# Patient Record
Sex: FEMALE | Race: WHITE
[De-identification: names, ages, dates, MRNs, and addresses within clinical notes are randomized per-mention and may not be internally consistent; named-entity substitution may affect disease eponyms.]

---

## 2017-08-28 ENCOUNTER — HOSPITAL ENCOUNTER (INPATIENT)
Dept: HOSPITAL 42 - ED | Age: 67
LOS: 4 days | Discharge: NURSING, REHAB OR LONG-TERM CARE | DRG: 313 | End: 2017-09-01
Attending: INTERNAL MEDICINE | Admitting: INTERNAL MEDICINE
Payer: MEDICARE

## 2017-08-28 VITALS — BODY MASS INDEX: 35.3 KG/M2

## 2017-08-28 DIAGNOSIS — F31.9: ICD-10-CM

## 2017-08-28 DIAGNOSIS — G20: ICD-10-CM

## 2017-08-28 DIAGNOSIS — Z79.899: ICD-10-CM

## 2017-08-28 DIAGNOSIS — F41.9: ICD-10-CM

## 2017-08-28 DIAGNOSIS — D64.9: ICD-10-CM

## 2017-08-28 DIAGNOSIS — J30.2: ICD-10-CM

## 2017-08-28 DIAGNOSIS — R40.2412: ICD-10-CM

## 2017-08-28 DIAGNOSIS — R19.7: ICD-10-CM

## 2017-08-28 DIAGNOSIS — F22: ICD-10-CM

## 2017-08-28 DIAGNOSIS — Q79.1: ICD-10-CM

## 2017-08-28 DIAGNOSIS — Z85.3: ICD-10-CM

## 2017-08-28 DIAGNOSIS — E66.9: ICD-10-CM

## 2017-08-28 DIAGNOSIS — E11.9: ICD-10-CM

## 2017-08-28 DIAGNOSIS — F25.9: ICD-10-CM

## 2017-08-28 DIAGNOSIS — Z90.710: ICD-10-CM

## 2017-08-28 DIAGNOSIS — Z90.49: ICD-10-CM

## 2017-08-28 DIAGNOSIS — G47.00: ICD-10-CM

## 2017-08-28 DIAGNOSIS — Z79.82: ICD-10-CM

## 2017-08-28 DIAGNOSIS — E78.00: ICD-10-CM

## 2017-08-28 DIAGNOSIS — R09.02: ICD-10-CM

## 2017-08-28 DIAGNOSIS — Z87.442: ICD-10-CM

## 2017-08-28 DIAGNOSIS — R07.89: Primary | ICD-10-CM

## 2017-08-28 DIAGNOSIS — R26.9: ICD-10-CM

## 2017-08-28 DIAGNOSIS — G30.9: ICD-10-CM

## 2017-08-28 DIAGNOSIS — K59.00: ICD-10-CM

## 2017-08-28 DIAGNOSIS — R00.1: ICD-10-CM

## 2017-08-28 DIAGNOSIS — I10: ICD-10-CM

## 2017-08-28 DIAGNOSIS — I95.9: ICD-10-CM

## 2017-08-28 DIAGNOSIS — E03.9: ICD-10-CM

## 2017-08-28 DIAGNOSIS — F02.80: ICD-10-CM

## 2017-08-28 DIAGNOSIS — E78.5: ICD-10-CM

## 2017-08-28 DIAGNOSIS — M54.5: ICD-10-CM

## 2017-08-28 LAB
ALBUMIN/GLOB SERPL: 1.2 {RATIO} (ref 1.1–1.8)
ALP SERPL-CCNC: 115 U/L (ref 38–133)
ALT SERPL-CCNC: 29 U/L (ref 7–56)
APTT BLD: 27.2 SECONDS (ref 23.7–30.8)
AST SERPL-CCNC: 26 U/L (ref 15–39)
BASOPHILS # BLD AUTO: 0.03 K/MM3 (ref 0–2)
BASOPHILS NFR BLD: 0.3 % (ref 0–3)
BILIRUB SERPL-MCNC: 0.4 MG/DL (ref 0.2–1.3)
BUN SERPL-MCNC: 18 MG/DL (ref 7–21)
CALCIUM SERPL-MCNC: 9.1 MG/DL (ref 8.4–10.5)
CHLORIDE SERPL-SCNC: 104 MMOL/L (ref 98–107)
CHOLEST SERPL-MCNC: 90 MG/DL (ref 130–200)
CO2 SERPL-SCNC: 26 MMOL/L (ref 21–33)
EOSINOPHIL # BLD: 0.2 10*3/UL (ref 0–0.7)
EOSINOPHIL NFR BLD: 1.6 % (ref 1.5–5)
ERYTHROCYTE [DISTWIDTH] IN BLOOD BY AUTOMATED COUNT: 15.4 % (ref 11.5–14.5)
GLOBULIN SER-MCNC: 3.4 GM/DL
GLUCOSE SERPL-MCNC: 106 MG/DL (ref 70–110)
GRANULOCYTES # BLD: 6.48 10*3/UL (ref 1.4–6.5)
GRANULOCYTES NFR BLD: 63.5 % (ref 50–68)
HCT VFR BLD CALC: 34.4 % (ref 36–48)
INR PPP: 1.03 (ref 0.93–1.08)
LIPASE SERPL-CCNC: 66 U/L (ref 23–300)
LYMPHOCYTES # BLD: 2.5 10*3/UL (ref 1.2–3.4)
LYMPHOCYTES NFR BLD AUTO: 24.2 % (ref 22–35)
MCH RBC QN AUTO: 30.9 PG (ref 25–35)
MCHC RBC AUTO-ENTMCNC: 34.3 G/DL (ref 31–37)
MCV RBC AUTO: 90.1 FL (ref 80–105)
MONOCYTES # BLD AUTO: 1.1 10*3/UL (ref 0.1–0.6)
MONOCYTES NFR BLD: 10.4 % (ref 1–6)
PLATELET # BLD: 217 10^3/UL (ref 120–450)
PMV BLD AUTO: 12.5 FL (ref 7–11)
POTASSIUM SERPL-SCNC: 3.7 MMOL/L (ref 3.6–5)
PROT SERPL-MCNC: 7.6 G/DL (ref 5.8–8.3)
SODIUM SERPL-SCNC: 140 MMOL/L (ref 132–148)
T4 FREE SERPL-MCNC: 1.2 NG/DL (ref 0.78–2.19)
T4 SERPL-MCNC: 7.8 UG/DL (ref 5.5–11)
TROPONIN I SERPL-MCNC: < 0.01 NG/ML
TSH SERPL-ACNC: 2.74 MIU/ML (ref 0.46–4.68)
WBC # BLD AUTO: 10.2 10^3/UL (ref 4.5–11)

## 2017-08-28 RX ADMIN — INSULIN HUMAN SCH: 100 INJECTION, SOLUTION PARENTERAL at 17:40

## 2017-08-28 RX ADMIN — INSULIN HUMAN SCH: 100 INJECTION, SOLUTION PARENTERAL at 21:27

## 2017-08-28 RX ADMIN — ENOXAPARIN SODIUM SCH MG: 40 INJECTION SUBCUTANEOUS at 14:43

## 2017-08-28 RX ADMIN — PANTOPRAZOLE SODIUM SCH MG: 40 TABLET, DELAYED RELEASE ORAL at 14:43

## 2017-08-28 NOTE — ED PDOC
Arrival/HPI





- General


Time Seen by Provider: 08/28/17 11:21


Historian: Patient





- History of Present Illness


Narrative History of Present Illness (Text): 


08/28/17 11:25


Dulce Rhodes is a 67 year old female, whose past medical history includes 

hypertension and diabetes, Alzheimer's/Dementia, presents to the emergency 

department complaining of chest pain for the past two days. Patient reports the 

pain radiates to both left and right side of the chest. Patient describes the 

pain as chest pressure. She also states feeling tired with generalized fatigue. 

Patient denies shortness of breath, headache, fever, chills cough, nausea, 

vomiting, diarrhea, diaphoresis, jaw pain, back pain, or other complaints. 

Family reports that she is getting more agitated at home and are looking into 

long term placement.








Time/Duration: < week (2 dyas)


Symptom Onset: Sudden


Symptom Course: Unchanged


Quality: Pressure


Modifying Factors (Text): 


worse after eating


Associated Symptoms (Text): 


tired, weakness in legs, and epigastric pain





Past Medical History





- Provider Review


Nursing Documentation Reviewed: Yes





Family/Social History





- Physician Review


Nursing Documentation Reviewed: Yes


Family/Social History: Unknown Family HX





Allergies/Home Meds


Allergies/Adverse Reactions: 


Allergies





No Known Allergies Allergy (Unverified 08/28/17 11:28)


 








Home Medications: 


 Home Meds











 Medication  Instructions  Recorded  Confirmed


 


Atorvastatin [Lipitor] 40 mg PO DAILY 08/28/17 08/28/17


 


Clonazepam 0.5 mg PO DAILY 08/28/17 08/28/17


 


Escitalopram [Lexapro] 20 mg PO DAILY 08/28/17 08/28/17


 


Levothyroxine [Synthroid] 25 mcg PO DAILY 08/28/17 08/28/17


 


Lisinopril [Zestril] 30 mg PO DAILY 08/28/17 08/28/17


 


Multivitamin [Multivitamins] 1 each PO DAILY 08/28/17 08/28/17


 


Naproxen [Naprosyn Tab] 250 mg PO BID 08/28/17 08/28/17


 


Perphenazine 8 mg PO DAILY 08/28/17 08/28/17


 


traZODone [trazodone Hydrochloride] 100 mg PO BID 08/28/17 08/28/17














Review of Systems





- Review of Systems


Constitutional: Fatigue.  absent: Fevers


Eyes: absent: Vision Changes


ENT: absent: Hearing Changes


Respiratory: absent: SOB, Cough, Sputum, Wheezing


Cardiovascular: Chest Pain.  absent: Palpitations, Calf Pain, Orthopnea, Syncope


Gastrointestinal: absent: Abdominal Pain, Constipation, Diarrhea, Nausea, 

Vomiting


Genitourinary Female: absent: Dysuria, Frequency, Hematuria


Musculoskeletal: absent: Back Pain


Neurological: absent: Headache


Endocrine: absent: Diaphoresis





Physical Exam


Vital Signs Reviewed: Yes


Vital Signs











  Temp Pulse Pulse Resp BP BP Pulse Ox


 


 08/28/17 12:28   69   20  198/86 H   96


 


 08/28/17 11:35    74    137/108 H 


 


 08/28/17 11:30  98.3 F  74   27 H  137/108 H   97


 


 08/28/17 11:24  98.3 F  73   20  193/70 H   98











Temperature: Afebrile


Blood Pressure: Hypertensive


Pulse: Regular


Respiratory Rate: Normal


Appearance: Positive for: Well-Appearing, Non-Toxic, Comfortable


Pain Distress: None


Mental Status: Positive for: Alert and Oriented X 3





- Systems Exam


Head: Present: Atraumatic, Normocephalic


Pupils: Present: PERRL


Extroacular Muscles: Present: EOMI


Conjunctiva: Present: Normal


Mouth: Present: Moist Mucous Membranes


Neck: Present: Normal Range of Motion


Respiratory/Chest: Present: Clear to Auscultation, Good Air Exchange.  No: 

Respiratory Distress, Accessory Muscle Use


Cardiovascular: Present: Regular Rate and Rhythm, Normal S1, S2.  No: Murmurs


Abdomen: Present: Normal Bowel Sounds.  No: Tenderness, Distention, Peritoneal 

Signs


Back: Present: Normal Inspection


Upper Extremity: Present: Normal Inspection, NORMAL PULSES (equal bilateral 

radial pulses).  No: Cyanosis, Edema


Lower Extremity: Present: Normal Inspection.  No: Edema


Neurological: Present: GCS=15, CN II-XII Intact, Speech Normal


Skin: Present: Warm, Dry, Normal Color.  No: Rashes


Psychiatric: Present: Alert, Oriented x 3, Normal Insight, Normal Concentration





Medical Decision Making


ED Course and Treatment: 


08/28/17 11:25


Impression:


67 year old female with chest pain for the past two days that radiates to both 

sides of chest. 





Differential Diagnosis included but are not limited to:  pneumonia vs. 

pneumothorax vs acs





Plan:


-- EKG


-- Chest X-ray


-- Labs


-- Aspirin


-- Reassess and disposition








Progress Notes:


 


EKG:


Ordered, reviewed, and independently interpreted the EKG.


Rate :  69 BPM


Rhythm : NSR


Interpretation : No ST-segment elevations or depressions, no T-wave inversions, 

normal intervals.


Comparison : No previous EKG for comparison. 





08/28/17 12:20


Chest X-ray: Creator : Jose Carlos Herring MD


FINDINGS:


LUNGS: No active pulmonary disease.


PLEURA: No significant pleural effusion identified, no pneumothorax apparent.


CARDIOVASCULAR: Normal.


OSSEOUS STRUCTURES: No significant abnormalities.


VISUALIZED UPPER ABDOMEN: There is focal eventration of the left hemidiaphragm.


OTHER FINDINGS: None.


IMPRESSION: No active disease.








08/28/17 14:19


Patient has multiple cardiac risk factors and has never had a cardiac workup.  

Paient accepted by Dr. Abbott-covering medicine on call today.  Family at 

bedside.  Psychiatric consult placed due to worsening agitation at day program.





- Lab Interpretations


Lab Results: 








 08/28/17 11:45 





 08/28/17 11:45 





 Lab Results





08/28/17 11:45: Sodium 140, Potassium 3.7, Chloride 104, Carbon Dioxide 26, 

Anion Gap 14, BUN 18, Creatinine 0.7, Est GFR (African Amer) > 60, Est GFR (Non-

Af Amer) > 60, Random Glucose 106, Calcium 9.1, Total Bilirubin 0.4, AST 26, 

ALT 29, Alkaline Phosphatase 115, Total Creatine Kinase 97, Troponin I < 0.01, 

Total Protein 7.6, Albumin 4.2, Globulin 3.4, Albumin/Globulin Ratio 1.2, 

Lipase 66


08/28/17 11:45: PT 11.1, INR 1.03, APTT 27.2


08/28/17 11:45: WBC 10.2, RBC 3.82, Hgb 11.8 L, Hct 34.4 L, MCV 90.1, MCH 30.9, 

MCHC 34.3, RDW 15.4 H, Plt Count 217, MPV 12.5 H, Gran % 63.5, Lymph % (Auto) 

24.2, Mono % (Auto) 10.4 H, Eos % (Auto) 1.6, Baso % (Auto) 0.3, Gran # 6.48, 

Lymph # 2.5, Mono # 1.1 H, Eos # 0.2, Baso # 0.03








I have reviewed the lab results: Yes





- RAD Interpretation


Radiology Orders: 








08/28/17 11:28


CHEST PORTABLE [RAD] Stat 











: Radiologist





- EKG Interpretation


Interpreted by ED Physician: Yes


Type: 12 lead EKG





- Medication Orders


Current Medication Orders: 








Enoxaparin Sodium (Lovenox)  40 mg SC DAILY [facility]


   PRN Reason: Protocol


   Last Admin: 08/28/17 14:43  Dose: 40 mg





Pantoprazole Sodium (Protonix Ec Tab)  40 mg PO DAILY [facility]


   Last Admin: 08/28/17 14:43  Dose: 40 mg





Discontinued Medications





Aspirin (Aspirin Chewable)  324 mg PO STAT STA


   Stop: 08/28/17 11:30


   Last Admin: 08/28/17 12:10  Dose: 324 mg











- Scribe Statement


The provider has reviewed the documentation as recorded by the Scribe


08/28/2017


Kary Haemed





Provider Scribe Attestation:


All medical record entries made by the Scribe were at my direction and 

personally dictated by me. I have reviewed the chart and agree that the record 

accurately reflects my personal performance of the history, physical exam, 

medical decision making, and the department course for this patient. I have 

also personally directed, reviewed, and agree with the discharge instructions 

and disposition. 








Disposition/Present on Arrival





- Present on Arrival


Any Indicators Present on Arrival: No





- Disposition


Have Diagnosis and Disposition been Completed?: Yes


Diagnosis: 


 Chest pain





Disposition: HOSPITALIZED


Disposition Time: 11:27


Patient Plan: Observation


Condition: FAIR

## 2017-08-28 NOTE — CP.PCM.HP
<Chantale Loera - Last Filed: 17 15:23>





History of Present Illness





- History of Present Illness


History of Present Illness: 





Internal medicine H & P for Dr. Diana Loera, PGY-1





Pt S & E at bedside. 





68 YO F w/PMH sig for HTN, DM, Alzheimer's dementia, hx nephrolithiasis, R 

breast CA s/p surgery admitted for HTN and Chest pain x 2 days.  Pt attends 

adult day care where VS are monitored- pt originally sent to ED 2/2 HTN.  Pt 

reports 1 episode of Right sided chest pain - intermittent, moderate, "pressure

", with radiating to LUE- resolved.  Admits to fatigue, HA, R neck pain, Right 

sided UE shaking (remote hx of), spots in vision, blurry vision, cough, 

rhinorrhea, occasional diarrhea, SOB (resolved), insomonia, LE pain/

paresthesias.  Denies N/V/F/C, diaphoresis. 





PMH: HTN, DM, Alzheimer's dementia, hx nephrolithiasis, R breast CA s/p surgery


PSH: Cholecystectomy, hysterectomy, kidney stone removal, R breast surgery


All: Seasonal


SH: Denies ETOH, tobacco, or illicit drug use


PMD: Unknown





Present on Admission





- Present on Admission


Any Indicators Present on Admission: No


History of DVT/PE: No


History of Uncontrolled Diabetes: No


Urinary Catheter: No


Decubitus Ulcer Present: No





Review of Systems





- Review of Systems


All systems: reviewed and no additional remarkable complaints except





- Constitutional


Constitutional: Fatigue, Headache, Weight Gain, Weakness.  absent: Chills, Fever





- EENT


Eyes: Blurred Vision, Change in Vision, Spots in Vision


Ears: absent: Dizziness


Nose/Mouth/Throat: absent: Sore Throat





- Cardiovascular


Cardiovascular: Chest Pain.  absent: Diaphoresis, Leg Edema, Palpitations





- Respiratory


Respiratory: Cough





- Gastrointestinal


Gastrointestinal: Diarrhea (occasional).  absent: Abdominal Pain, Constipation, 

Nausea, Vomiting





- Genitourinary


Genitourinary: absent: Change in Urinary Stream





- Musculoskeletal


Musculoskeletal: Neck Pain, Numbness (of extremities), Tingling (of extremities)





- Psychiatric


Psychiatric: Abnormal Sleep Pattern (insomnia)





Past Patient History





- Past Social History


Smoking Status: Never Smoked





- CARDIAC


Hx Cardiac Disorders: Yes


Hx Hypertension: Yes





- PULMONARY


Hx Respiratory Disorders: No





- NEUROLOGICAL


Hx Neurological Disorder: Yes


Hx Alzheimer's Disease: Yes





- HEENT


Hx HEENT Problems: No





- RENAL


Hx Chronic Kidney Disease: No





- ENDOCRINE/METABOLIC


Hx Endocrine Disorders: Yes


Hx Diabetes Mellitus Type 2: Yes





- HEMATOLOGICAL/ONCOLOGICAL


Hx Blood Disorders: No





- INTEGUMENTARY


Hx Dermatological Problems: No





- MUSCULOSKELETAL/RHEUMATOLOGICAL


Hx Musculoskeletal Disorders: No





- GASTROINTESTINAL


Hx Gastrointestinal Disorders: Yes


Hx Constipation: Yes





- GENITOURINARY/GYNECOLOGICAL


Hx Genitourinary Disorders: No





- PSYCHIATRIC


Hx Substance Use: No





- SURGICAL HISTORY


Hx Surgeries: Yes


Hx  Section: Yes (x3)





Meds


Allergies/Adverse Reactions: 


 Allergies











Allergy/AdvReac Type Severity Reaction Status Date / Time


 


No Known Allergies Allergy   Unverified 17 11:28














Physical Exam





- Constitutional


Appears: Non-toxic, No Acute Distress





- Head Exam


Head Exam: ATRAUMATIC, NORMAL INSPECTION, NORMOCEPHALIC





- Eye Exam


Eye Exam: EOMI, Normal appearance.  absent: Nystagmus





- ENT Exam


ENT Exam: Mucous Membranes Moist, Normal Exam





- Neck Exam


Neck exam: Positive for: Full Rom, Normal Inspection.  Negative for: 

Lymphadenopathy, Meningismus, Tenderness





- Respiratory Exam


Respiratory Exam: Clear to Auscultation Bilateral, NORMAL BREATHING PATTERN.  

absent: Accessory Muscle Use, Chest Wall Tenderness, Rales, Rhonchi, Wheezes, 

Respiratory Distress





- Cardiovascular Exam


Cardiovascular Exam: REGULAR RHYTHM, +S1, +S2





- GI/Abdominal Exam


GI & Abdominal Exam: Normal Bowel Sounds, Soft.  absent: Tenderness





- Extremities Exam


Extremities exam: Positive for: normal inspection.  Negative for: tenderness





- Neurological Exam


Neurological exam: Alert, CN II-XII Intact





- Psychiatric Exam


Psychiatric exam: Normal Affect, Normal Mood





- Skin


Skin Exam: Dry, Intact, Normal Color, Warm





Results





- Vital Signs


Recent Vital Signs: 





 Last Vital Signs











Temp  98.3 F   17 11:30


 


Pulse  70   17 14:59


 


Resp  20   17 14:59


 


BP  166/78 H  17 14:59


 


Pulse Ox  98   17 14:59














- Labs


Result Diagrams: 


 17 11:45





 17 11:45





Assessment & Plan





- Assessment and Plan (Free Text)


Assessment: 





68 YO F w/PMH sig for HTN, DM, Alzheimer's dementia, hx nephrolithiasis, R 

breast CA s/p surgery admitted for HTN and Chest pain x 2 days, currently CP 

resolved, continues with symptoms of HTN - HA, vision changes


Plan: 





Chest pain


EKG -NSR


CXR - NAD


Trops neg x 1


FU serial trops/EKGs


FU A1c


FU CKP


Cont home meds: Lipitor


Given ASA x 1


Cardiology consulted 





HTN


BP improved - 166/74


Cont home med: Lisinopril


clonidine PRN


Monitor





DM


ISS


Accuchecks


Diabetic/HHD 





Alzheimer's dementia


Psych consulted 





Hypothyroidism


FU FT4


FU TSH


Cont home med: Synthyroid





Depression/insomnia


Cont home med: Lexapro, trazodone, clonazepam





GI/DVT ppx


Protonix


Lovenox


TEDs


SCDs





Dispo


Admit to tele


VS as per protocol


OOBTC


PT 


HHD/Mod-CCHO diet





Will DW attending





Luz Maria, PGY-1








- Date & Time


Date: 17


Time: 15:13





Decision To Admit





- Pt Status Changed To:


Hospital Disposition Of: Observation





- .


Bed Request Type: Telemetry


Admitting Physician: Rufus Abbott





<Rufus Abbott - Last Filed: 17 22:12>





Results





- Vital Signs


Recent Vital Signs: 





 Last Vital Signs











Temp  98.2 F   17 07:40


 


Pulse  66   17 07:40


 


Resp  20   17 07:40


 


BP  116/69   17 10:22


 


Pulse Ox  95   17 07:40














- Labs


Result Diagrams: 


 17 07:40





 17 07:40





Attending/Attestation





- Attestation


I have personally seen and examined this patient.: Yes


I have fully participated in the care of the patient.: Yes


I have reviewed all pertinent clinical information: Yes

## 2017-08-28 NOTE — RAD
HISTORY:

chest pain  



COMPARISON:

No prior. 



FINDINGS:



LUNGS:

No active pulmonary disease.



PLEURA:

No significant pleural effusion identified, no pneumothorax apparent.



CARDIOVASCULAR:

Normal.



OSSEOUS STRUCTURES:

No significant abnormalities.



VISUALIZED UPPER ABDOMEN:

There is focal eventration of the left hemidiaphragm.



OTHER FINDINGS:

None.



IMPRESSION:

No active disease.

## 2017-08-29 LAB
FOLATE SERPL-MCNC: > 20 NG/ML
TROPONIN I SERPL-MCNC: < 0.01 NG/ML

## 2017-08-29 RX ADMIN — INSULIN HUMAN SCH: 100 INJECTION, SOLUTION PARENTERAL at 08:32

## 2017-08-29 RX ADMIN — PANTOPRAZOLE SODIUM SCH MG: 40 TABLET, DELAYED RELEASE ORAL at 11:05

## 2017-08-29 RX ADMIN — ENOXAPARIN SODIUM SCH MG: 40 INJECTION SUBCUTANEOUS at 11:05

## 2017-08-29 RX ADMIN — THERA TABS SCH TAB: TAB at 11:06

## 2017-08-29 RX ADMIN — INSULIN HUMAN SCH: 100 INJECTION, SOLUTION PARENTERAL at 16:10

## 2017-08-29 RX ADMIN — POLYETHYLENE GLYCOL 3350 SCH GM: 17 POWDER, FOR SOLUTION ORAL at 11:06

## 2017-08-29 RX ADMIN — INSULIN HUMAN SCH: 100 INJECTION, SOLUTION PARENTERAL at 11:30

## 2017-08-29 RX ADMIN — POLYETHYLENE GLYCOL 3350 SCH GM: 17 POWDER, FOR SOLUTION ORAL at 17:21

## 2017-08-29 NOTE — CON
DATE:  08/28/2017



HISTORY OF PRESENT ILLNESS:  The patient is a 67-year-old woman who

presents with agitation.  The patient has a long history of Alzheimer's

with dementia.



She was noted by her caretakers to be increasingly agitated.



The patient's past medical history besides Alzheimer's reveals a history of

hypertension and hypercholesterolemia with borderline diabetes mellitus. 

No previous cardiac history is noted.



The patient admits to diffuse body aches.



SOCIAL HISTORY:  Denies smoking.



REVIEW OF SYSTEMS:  Besides agitation includes atypical chest pain without

shortness of breath.



PHYSICAL EXAMINATION:

VITAL SIGNS:  Blood pressure 166/78, heart rate in the 70s.

NECK:  Negative JVD.

LUNGS:  Without rales.

HEART:  S1, S2.

EXTREMITIES:  Without edema.



EKG is unremarkable.



Hemoglobin is 11.8.  Chemistries revealed a first troponin to be negative.



IMPRESSION:

1.  Increasing agitation.

2.  Atypical chest pain.

3.  Hypertension.

4.  Hypercholesterolemia.

5.  Questionable history of diabetes mellitus.



Given these findings, there is no evidence for acute coronary syndrome. 

Her blood pressure is better on clonidine.



Psychiatry has been consulted for adjusting her dementia and Alzheimer's

medications.





__________________________________________

Charly Rebolledo MD





DD:  08/28/2017 16:35:39

DT:  08/28/2017 16:41:25

Job # [id number]

## 2017-08-29 NOTE — CP.PCM.PN
Addendum entered and electronically signed by Chantale Loera DO  08/31/17 12:36: 





Physical Exam





- Constitutional


Appears: Non-toxic, No Acute Distress





- Head Exam


Head Exam: ATRAUMATIC, NORMAL INSPECTION, NORMOCEPHALIC





- Eye Exam


Eye Exam: EOMI, Normal appearance.  absent: Nystagmus





- ENT Exam


ENT Exam: Mucous Membranes Moist, Normal Exam





- Neck Exam


Neck exam: Positive for: Full Rom, Normal Inspection.  Negative for: 

Lymphadenopathy, Meningismus, Tenderness





- Respiratory Exam


Respiratory Exam: Clear to Auscultation Bilateral, NORMAL BREATHING PATTERN.  

absent: Accessory Muscle Use, Chest Wall Tenderness, Rales, Rhonchi, Wheezes, 

Respiratory Distress





- Cardiovascular Exam


Cardiovascular Exam: REGULAR RHYTHM, +S1, +S2





- GI/Abdominal Exam


GI & Abdominal Exam: Normal Bowel Sounds, Soft.  absent: Tenderness





- Extremities Exam


Extremities exam: Positive for: normal inspection.  Negative for: tenderness





- Neurological Exam


Neurological exam: Alert, Awake, CN II-XII Intact; shaking of RUE decreased





- Psychiatric Exam


Psychiatric exam: Normal Affect, Normal Mood





- Skin


Skin Exam: Dry, Intact, Normal Color, Warm





Original Note:








<Chantale Loera - Last Filed: 08/29/17 09:25>





Subjective





- Date & Time of Evaluation


Date of Evaluation: 08/29/17


Time of Evaluation: 06:30





- Subjective


Subjective: 





Internal medicine progress note for Dr. Diana Loera, PGY-1





Pt S & E at bedside. 





Pt reports HA overnight, continues with R sided neck pain, now with concerns 

regarding skin changes over B/L LE.  Denies N/V/F/C, SOB, CP, ab pain.  

Tolerating diet. 





Objective





- Vital Signs/Intake and Output


Vital Signs (last 24 hours): 


 











Temp Pulse Resp BP Pulse Ox


 


 97.8 F   65   20   178/79 H  98 


 


 08/29/17 05:30  08/29/17 05:30  08/29/17 05:30  08/29/17 05:30  08/29/17 05:30








Intake and Output: 


 











 08/29/17 08/29/17





 06:59 18:59


 


Intake Total 120 


 


Balance 120 














- Medications


Medications: 


 Current Medications





Acetaminophen (Tylenol 325mg Tab)  650 mg PO Q6H PRN


   PRN Reason: Pain, moderate (4-7)


   Last Admin: 08/29/17 05:21 Dose:  650 mg


Aspirin (Ecotrin)  81 mg PO DAILY [facility]


Atorvastatin Calcium (Lipitor)  40 mg PO DIN [facility]


   Last Admin: 08/28/17 21:26 Dose:  40 mg


Clonazepam (Klonopin)  0.5 mg PO DAILY [facility]


   PRN Reason: Protocol


Clonidine HCl (Catapres)  0.1 mg PO Q6H PRN


   PRN Reason: Systolic Blood Pressure


   Last Admin: 08/29/17 05:20 Dose:  0.1 mg


Enoxaparin Sodium (Lovenox)  40 mg SC DAILY [facility]


   PRN Reason: Protocol


   Last Admin: 08/28/17 14:43 Dose:  40 mg


Escitalopram Oxalate (Lexapro)  20 mg PO DAILY [facility]


Insulin Human Regular (Humulin R Low)  0 units SC ACHS [facility]


   PRN Reason: Protocol


   Last Admin: 08/28/17 21:27 Dose:  Not Given


Levothyroxine Sodium (Synthroid)  25 mcg PO 0600 [facility]


   Last Admin: 08/29/17 05:08 Dose:  25 mcg


Losartan Potassium (Cozaar)  100 mg PO DAILY [facility]


Metoprolol Tartrate (Lopressor)  25 mg PO BRKDIN [facility]


Multivitamins (Thera Tab)  1 tab PO DAILY [facility]


Pantoprazole Sodium (Protonix Ec Tab)  40 mg PO DAILY [facility]


   Last Admin: 08/28/17 14:43 Dose:  40 mg


Perphenazine (Perphenazine)  8 mg PO DAILY [facility]


Trazodone HCl (Desyrel)  100 mg PO DAILY [facility]











- Labs


Labs: 


 











PT  11.1 Seconds (9.9-11.8)   08/28/17  11:45    


 


INR  1.03  (0.93-1.08)   08/28/17  11:45    


 


APTT  27.2 Seconds (23.7-30.8)   08/28/17  11:45    














Assessment and Plan





- Assessment and Plan (Free Text)


Assessment: 





66 YO F w/PMH sig for HTN, DM, Alzheimer's dementia, hx nephrolithiasis, R 

breast CA s/p surgery admitted for HTN and Chest pain x 2 days, denies CP 

overnight, HA overnight. 


Plan: 





Chest pain- resolved


No EKG changes


Trops neg x 4


A1c 6.1


CKP WNL


FU Folate


FU B12


FU UDS


Cont home meds: Lipitor


Cardiology following- likely not ACS





HTN


HTN overnight- given clonidine x 1


Cont home med: Lisinopril


clonidine PRN


Monitor





DM


ISS


Accuchecks


Diabetic/HHD 





Alzheimer's dementia


Psych following





Hypothyroidism


FT4 1.2


TSH 2.74


T4 7.8


Cont home med: Synthyroid





Depression/insomnia


Cont home med: Lexapro, trazodone, clonazepam





GI/DVT ppx


Protonix


Lovenox


TEDs


SCDs





Dispo


OOBTC


PT 


HHD/Mod-CCHO diet





Will DW attending





Luz Maria, PGY-1








<Rufus Abbott U - Last Filed: 09/20/17 22:13>





Objective





- Vital Signs/Intake and Output


Vital Signs (last 24 hours): 


 











Temp Pulse Resp BP Pulse Ox


 


 98.2 F   66   20   116/69   95 


 


 09/01/17 07:40  09/01/17 07:40  09/01/17 07:40  09/01/17 10:22  09/01/17 07:40











- Labs


Labs: 


 





 08/31/17 07:40 





 08/31/17 07:40 





 











PT  11.1 Seconds (9.9-11.8)   08/28/17  11:45    


 


INR  1.03  (0.93-1.08)   08/28/17  11:45    


 


APTT  27.2 Seconds (23.7-30.8)   08/28/17  11:45    














Attending/Attestation





- Attestation


I have personally seen and examined this patient.: Yes


I have fully participated in the care of the patient.: Yes


I have reviewed all pertinent clinical information, including history, physical 

exam and plan: Yes

## 2017-08-29 NOTE — PN
ADDENDUM



The patient's daughter-in-law Regina Feliz approached this writer. 

Phone number is [phone].  Regina reported that her mother-in-law,

the patient, was diagnosed with Alzheimer dementia.  She has history of

mental illness as well, history of being admitted to the psychiatric

inpatient unit in the past.  Recently, the patient was paranoid, the

patient was forgetful as well.  The patient was feeling that somebody is

stealing her stuff, her underwear as well as some lotions.  The patient has

not exhibiting any aggressive or agitated behavior.  Confirmed the

information of both of her sons already this is what the patient reported. 

The patient is seeing a psychiatrist at [facility]

and the patient filled her medication in [facility].  [facility] was called, [phone].  The patient was on trazodone 200 mg at

the nighttime prescribed by Dr. Sandra Adkins.  The patient was also on

perphenazine 8 mg daily filled in 07/28/2017.  The patient also is on

Lexapro 20 mg a day filled on 08/14/2017.  The patient also is on Klonopin

0.5 mg daily, same prescriber, filled on 08/14/2017.  The patient has all

of the refills in the pharmacy.  At present moment, the patient's family

wants to place the patient in the nursing home.  At the same time, the

patient's daughter-in-law reported that the patient's son is power of

 for the patient.  There is no official document.  The patient's

son, name is Mr. Carmelo Montaño, phone number is [phone].  Family

is advised to bring legal papers to the hospital and discuss with the

 about disposition plan.  Collateral information was

appreciative.  Should you have any questions, give me a call back.



Thank you very much for letting me to participate in care of your patient.







__________________________________________

Carmen Soriano MD



DD:  08/29/2017 13:10:03

DT:  08/29/2017 13:35:44

Job # [id number]

## 2017-08-29 NOTE — PN
DATE:  08/29/2017



CARDIOLOGY FOLLOWUP



SUBJECTIVE:  The patient denies chest pain.



PHYSICAL EXAMINATION:

VITAL SIGNS:  Blood pressure 109/83 and heart rates in the 50s.

NECK:  Negative JVD.

HEART:  S1 and S2.

LUNGS:  Without rales.

EXTREMITIES:  Without edema.



LABORATORY DATA:  Troponins are negative x3.  The hemoglobin was not drawn

today.



Echocardiogram reveals an ejection fraction of 50%-55%.



No LV outflow obstruction is noted.



IMPRESSION:

1.  Atypical chest pain.

2.  No evidence for acute coronary syndrome.

3.  Hypercholesterolemia.

4.  Hypertension.

5.  Questionable history of diabetes mellitus.

6.  History of Alzheimer's.



Given these findings, there is no evidence for acute coronary syndrome.  We

will discontinued telemetry today.  We will arrange for an outpatient

stress test.





__________________________________________

Charly Rebolledo MD







DD:  08/29/2017 12:27:08

DT:  08/29/2017 21:17:31

Job # [id number]
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
## 2017-08-29 NOTE — CT
PROCEDURE:  CT HEAD WITHOUT CONTRAST.



HISTORY:

???dementia



COMPARISON:

None available. 



TECHNIQUE:

Axial computed tomography images were obtained through the head/brain 

without intravenous contrast.  



Radiation dose:



Total exam DLP = 756 mGy-cm.



This CT exam was performed using one or more of the following dose 

reduction techniques: Automated exposure control, adjustment of the 

mA and/or kV according to patient size, and/or use of iterative 

reconstruction technique.



FINDINGS:



HEMORRHAGE:

No intracranial hemorrhage. 



BRAIN:

No mass effect or edema.  No atrophy or chronic microvascular 

ischemic changes.



VENTRICLES:

Unremarkable. No hydrocephalus. 



CALVARIUM:

Unremarkable.



PARANASAL SINUSES:

Unremarkable as visualized. No significant inflammatory changes.



MASTOID AIR CELLS:

Unremarkable as visualized. No inflammatory changes.



OTHER FINDINGS:

None.



IMPRESSION:

No acute findings

## 2017-08-29 NOTE — PN
DATE OF SERVICE:  08/29/2017



LOCATION:  The patient was seen in Room 262, bed 1.



SUBJECTIVE:  The patient is seen lying in the bed.  The patient was

examined with the patient's nurse at the bedside.  The patient was earlier

seen by Dr. Carmen Soriano from psychiatry.  Detailed psych history was

obtained by the psychiatrist today as per the patient's nurse.  Overnight

nurses' notes were reviewed.  The patient was found to be alert, awake,

oriented x2, confused at times.  The patient is seen lying in the bed.  The

patient state that the chest pain has resolved.  According to the patient's

nurse and , the patient's chest pain was precipitated by the

information which was provided to me by the patient's nurse that the

patient's son and the daughter-in-law are in process of having the patient

placed in the nursing home, which made the patient upset and developed

chest pain, and the patient was brought to the emergency room.



PHYSICAL EXAMINATION:

VITAL SIGNS:  T-max afebrile.  Telemetry shows sinus rhythm.  Heart rate in

70s and 60s.  Blood pressure systolic high 170s to 180, diastolic in 70s

and 81, respiration 20 and O2 sat 98%.

HEAD:  Normocephalic, atraumatic.

HEENT:  Shows pinkish conjunctivae.  Anicteric sclerae.  No oropharyngeal

lesion.

NECK:  No neck rigidity.

CHEST:  Kyphosis.

LUNGS:  No rales, crackles, or wheezing.

CARDIOVASCULAR:  S1 and S2, regular rhythm.

ABDOMEN:  Soft.  Positive bowel sounds.

GENITALIA:  Female.

RECTAL:  Examination is deferred.

EXTREMITIES:  Shows no pitting edema, no calf tenderness, no Homans' sign.

NEUROLOGIC:  The patient is alert, awake, and oriented x3.  Cranial nerve

II through XII intact.  Gait examination is not tested.

VASCULAR:  Palpable pulses.



LABORATORY DATA:  Troponin; all the 3 sets are negative.  Thyroid panel is

negative.  A1c is 6.1.  Chest x-ray shows left hemidiaphragm eventration. 

EKG shows baseline artifact.



CURRENT MEDICATIONS:  Clonidine 0.1 q. 6 hours p.r.n., Cozaar 100 mg daily;

trazodone 100 mg daily; Ecotrin 81 mg daily; Humulin low dose sliding scale

coverage a.c. and at bedtime; Klonopin 0.5 daily; Lexapro 20 mg daily;

Lipitor 40 mg daily; Lopressor 25 twice a day; Lovenox 40 mg subQ daily;

MiraLax 17 g twice a day; perphenazine 8 mg daily; Protonix 40 mg daily;

multivitamin 1 tablet daily; Tylenol p.r.n.



IMPRESSION AND PLAN:

1.  Chest pain, etiology undetermined.

2.  Uncontrolled hypertension.

3.  Possible anxiety disorder.

4.  Mild normocytic anemia.

5.  Prediabetes.

6.  Left hemidiaphragm eventration.

7.  History of dyslipidemia.

8.  History of hypertension.

9.  History of type II diabetes mellitus.

10.  History of constipation.

11.  History of gait dysfunction.

12.  History of Alzheimer's dementia.

13.  History of depression.

1.  Chest pain, etiology undetermined.

2.  Transiently uncontrolled hypertension.

3.  History of Alzheimer's dementia.

4.  History of depression, history of insomnia, history of hypothyroidism,

history of hypertension, and history of dyslipidemia.

5.  Normocytic anemia.

6.  History of obesity.

7.  History of anxiety.





PLAN:  At this time, the patient is awaiting further cardiology and

psychiatry recommendation.  The patient has been ordered RPR, drug screen,

B12, folate.  The patient is requested to be seen by cardiology, neurology,

and psychiatry.  CT head has been ordered.  EKG has been ordered.  Echo

with Doppler, heart-healthy diet, out of bed.  At present, the patient is

awaiting further evaluation by neurology and further recommendation by

cardiology and psychiatry.  At present, the patient's case will be also

referred to case management for discharge planning.



Dictated and electronically signed, not read.







__________________________________________

Rufus Abbott MD





DD:  08/29/2017 9:31:40

DT:  08/29/2017 11:56:32

Job # [id number]





GINA

## 2017-08-29 NOTE — HP
HISTORY OF PRESENT ILLNESS:  The patient is a 67-year-old Hebrew-speaking

obese female who was brought into the emergency room by the EMS, [facility].  The patient reported chest pain radiating to left arm and neck

pain.  The patient was sent from the day care [facility] today.  According to

the ER physician evaluation, the patient came to the emergency room by

ambulance complaining of chest pain for 2 days, radiating to the left and

right side of the chest.  Described chest pressure with generalized

fatigue.



REVIEW OF SYSTEMS:  A 13  System review was done, pertinent positive and

negative dictated above.



CODE STATUS:  FULL CODE.



HEIGHT:  4 feet 11 inches.



WEIGHT:  175.



BMI:  35.



LIVING WILL/ADVANCE DIRECTIVE:  None.



HOME MEDICATIONS:

1.  Multivitamin 1 tablet daily.

2.  Trazodone 100 mg twice a day.

3.  Naprosyn 250 mg twice a day.

4.  Perphenazine 8 mg daily.

5.  Klonopin 0.5 mg daily.

6.  Lipitor 40 mg daily.

7.  Zestril 30 mg daily/.

8.  Synthroid 25 mcg daily.

9.  Lexapro 20 mg daily.



SOCIAL HISTORY:  Negative for substance abuse.  Negative for alcohol abuse.

Negative for smoking.  Negative for communicable transmissible disease.



MENSTRUAL HISTORY:  Postmenopausal.



PAST MEDICAL AND SURGICAL HISTORY:  History of questionable insomnia,

history of depression, history of anxiety, history of dyslipidemia, history

of hypertension, history of hypothyroidism.  Patient's past medical history

is also significant for hypertension, history of type 2 diabetes, history

of constipation, history of Alzheimer's disease, history of  x3.



PHYSICAL EXAMINATION:

GENERAL:  The patient was seen in room 262, bed 2.  The patient is seen

lying in the bed.

VITAL SIGNS:  T-max is 98.3.  Telemetry shows sinus rhythm, heart rate

69-74, blood pressure 193/70, 137/108, 198/86 166/76 and 166/84,

respirations 20.  O2 sat is 97%-98%.

HEENT:  Head examination normocephalic and atraumatic.  Shows pink

conjunctivae.  Anicteric sclerae.  No oropharyngeal lesion.  No neck

rigidity.

CHEST:  Kyphosis.

LUNGS:  Shows no rales, crackles or wheezing.

CARDIOVASCULAR:  S1 and S2, regular rhythm.

ABDOMEN:  Soft, protuberant.  Positive bowel sound.

GENITALIA:  Female.

RECTAL:  Deferred.

EXTREMITIES:  Shows no pitting edema.  No calf tenderness and no Jenna's

signs.

NEUROLOGIC:  The patient is alert, awake and responsive.  Is able to say

her name, able to follow simple commands.

MUSCULOSKELETAL:  Shows an elevated body mass index.



DIAGNOSTIC DATA:  The patient's diagnostic data was reviewed.  The patient

was seen and examined in room 262 and bed 1.  The patient has been moved

from her original room.



DIAGNOSTICS:  WBCs 10.2, hemoglobin/hematocrit 11.8 and 34.4 and platelet

217.  PT/PTT is normal.  Chemistry shows random glucose of 106 and 102. 

LFTs are normal.  Troponin is negative.  Cholesterol 90, triglyceride 34,

LDL less than 30, HDL 55.  TSH 2.74 and thyroxine 7.8.  The patient's chest

x-ray was reported to be no active disease.  EKG shows normal sinus rhythm

rate baseline artifact.  No old EKG available for comparison.



The patient was seen in the emergency room by the ER physician.  The

patient was given Lovenox 40 subcutaneous.  The patient was given Protonix.

Aspirin 324 was given.  The patient denies to be hospitalized.



IMPRESSION:

1.  Chest pain, etiology undetermined.

2.  Transiently uncontrolled hypertension.

3.  History of Alzheimer's dementia.

4.  History of depression, history of insomnia, history of hypothyroidism,

history of hypertension, and history of dyslipidemia.

5.  Normocytic anemia.

6.  History of obesity.

7.  History of anxiety.



PLAN:  At this time, the patient has been ordered serial labs.  The patient

has been ordered hemoglobin A1c, cardiology consultation has been

requested.  The patient has been ordered a psychiatry consultation from the

ER.  The patient is started on Cozaar 50 mg daily, Desyrel 100 mg daily,

aspirin 81 mg daily, regular insulin sliding scale coverage, Klonopin 0.5

mg daily, Lexapro 20 mg daily, Lipitor 40 mg daily, Lopressor 25 mg twice a

day, Lovenox 40 mg subcutaneous daily, perphenazine is resumed at 8 mg

daily, Protonix 40 mg daily, Synthroid 25 mcg daily, Tylenol p.r.n.  Repeat

EKG.  Heart healthy diet, out of bed, JOHANNA stockings has been ordered.  The

patient has been ordered fingerstick blood sugar.  SCDs ordered.  Physical

therapy evaluation ordered.  At present, the patient is seen in room 262,

bed 1.  The patient's further management will be dependent upon the

patient's clinical condition, hemodynamic status and as per the patient

response to therapeutic intervention, as per the patient's diagnostic test

results and as per recommendation by cardiology after evaluation.



Dictated and electronically signed, not read.





__________________________________________

Rufus Abbott MD





DD:  2017 20:29:30

DT:  2017 20:43:01

Job # [id number]

## 2017-08-30 LAB
BASOPHILS # BLD AUTO: 0.03 K/MM3 (ref 0–2)
BASOPHILS NFR BLD: 0.3 % (ref 0–3)
EOSINOPHIL # BLD: 0.2 10*3/UL (ref 0–0.7)
EOSINOPHIL NFR BLD: 2.1 % (ref 1.5–5)
ERYTHROCYTE [DISTWIDTH] IN BLOOD BY AUTOMATED COUNT: 15.2 % (ref 11.5–14.5)
GRANULOCYTES # BLD: 5.17 10*3/UL (ref 1.4–6.5)
GRANULOCYTES NFR BLD: 59.7 % (ref 50–68)
HCT VFR BLD CALC: 37.5 % (ref 36–48)
IRON SERPL-MCNC: 77 UG/DL (ref 45–180)
LYMPHOCYTES # BLD: 2.5 10*3/UL (ref 1.2–3.4)
LYMPHOCYTES NFR BLD AUTO: 29 % (ref 22–35)
MCH RBC QN AUTO: 30.6 PG (ref 25–35)
MCHC RBC AUTO-ENTMCNC: 34.1 G/DL (ref 31–37)
MCV RBC AUTO: 89.7 FL (ref 80–105)
MONOCYTES # BLD AUTO: 0.8 10*3/UL (ref 0.1–0.6)
MONOCYTES NFR BLD: 8.9 % (ref 1–6)
PLATELET # BLD: 209 10^3/UL (ref 120–450)
PMV BLD AUTO: 11.5 FL (ref 7–11)
RETICS/RBC NFR: 1.19 % (ref 0.5–1.5)
WBC # BLD AUTO: 8.7 10^3/UL (ref 4.5–11)

## 2017-08-30 RX ADMIN — THERA TABS SCH TAB: TAB at 10:55

## 2017-08-30 RX ADMIN — INSULIN HUMAN SCH: 100 INJECTION, SOLUTION PARENTERAL at 11:30

## 2017-08-30 RX ADMIN — INSULIN HUMAN SCH: 100 INJECTION, SOLUTION PARENTERAL at 21:41

## 2017-08-30 RX ADMIN — POLYETHYLENE GLYCOL 3350 SCH GM: 17 POWDER, FOR SOLUTION ORAL at 20:20

## 2017-08-30 RX ADMIN — ENOXAPARIN SODIUM SCH MG: 40 INJECTION SUBCUTANEOUS at 10:55

## 2017-08-30 RX ADMIN — PANTOPRAZOLE SODIUM SCH MG: 40 TABLET, DELAYED RELEASE ORAL at 10:55

## 2017-08-30 RX ADMIN — INSULIN HUMAN SCH: 100 INJECTION, SOLUTION PARENTERAL at 08:33

## 2017-08-30 RX ADMIN — INSULIN HUMAN SCH: 100 INJECTION, SOLUTION PARENTERAL at 16:45

## 2017-08-30 RX ADMIN — POLYETHYLENE GLYCOL 3350 SCH GM: 17 POWDER, FOR SOLUTION ORAL at 10:56

## 2017-08-30 NOTE — MRI
PROCEDURE:  MRI BRAIN WITHOUT CONTRAST



HISTORY:

dementia



COMPARISON:

None. 



TECHNIQUE:

Multiplanar, multisequence MR images of the brain were obtained 

without intravenous contrast enhancement.



FINDINGS:



HEMORRHAGE:

None



DWI:

No evidence of an acute or early subacute infarction.



BRAIN PARENCHYMA:

No mass effect or edema. Mild atrophy



VENTRICLES:

Unremarkable. No hydrocephalus.



CRANIUM:

Unremarkable.



ORBITS:

Grossly unremarkable.



PARANASAL SINUSES/MASTOIDS:

Clear



VASCULAR SYSTEM:

Skull base flow voids intact.



OTHER FINDINGS:

None. 



IMPRESSION:

Unremarkable non contrast enhanced MRI of the brain.

## 2017-08-30 NOTE — PN
The patient was followed up today.  The patient was started on oral

psychotropic medications.  This writer had prolonged conversation with the

patient's daughter-in-law yesterday and today with the patient's first son.

He is power of , but no legal document was brought in.  The patient

presented to be alert, oriented, emotional, and tearful.  The patient does

not want to go to the nursing home.  The patient wants to go back home.  As

per the patient's son, the patient suffered from mental illness for years,

was followed up by psychiatric mobile team in [place].  The patient was

forgetful at home.  The patient was also mildly paranoid, but there is no

physical aggression or agitation.  The patient wants to go back home,

refused to go to the nursing home.



PHYSICAL EXAMINATION

VITAL SIGNS:  This writer reviewed vital signs.  Vital signs seem to be

stable.  Temperature is 98.2, pulse 62, blood pressure 134/90, respirations

20, oxygen saturation 95%.



MEDICATIONS:  Reviewed; Tylenol, aspirin, Lipitor, Klonopin 0.5 mg daily,

Catapres 0.1 mg q.6 hours as needed, Aricept 5 mg p.o. daily at the

nighttime, Lovenox, Lexapro 20 mg daily, Humulin, Synthroid, Cozaar,

Lopressor, multivitamins, Protonix, perphenazine which will be increased to

8 mg twice a day.  The patient is also on Miralax and trazodone 100 mg but

was on 200 mg at the nighttime.



LABORATORY DATA:  Labs reviewed.  Most recent was from today.  No

abnormalities.



MENTAL STATUS EXAMINATION:  The patient appears to be alert, anxious,

tearful, intermittent eye contact.  Speech was soft, low volume.  Mood

described as depressed.  Affect was constricted but more reactive.  Mood

congruent.  Thought process circumstantial.  Thought content, the patient

denies visual, auditory, or tactile hallucinations, denied paranoid

ideation, but obviously the patient is mildly disorganized in her thoughts.

Insight and judgment are improving.  Impulses are well controlled.



IMPRESSION:  As per history, the patient has schizoaffective disorder

versus schizophrenia.  As per report, the patient has history of dementia. 

Medical team called for neurology evaluation.



PLAN:  Continue current management.  Trilafon will be increased to 8 mg

twice a day.  The patient will be continued on oral psychotropic

medications.  The patient's family needs to bring legal papers if they want

to be the one who is participating in discharge plan for this patient.



Thank you very much for letting me to participate in care of your patient. 

Should you have any questions, give me a call back.





__________________________________________

Carmen Soriano MD



DD:  08/30/2017 13:47:51

DT:  08/30/2017 14:48:33

Job # [id number]

## 2017-08-30 NOTE — CP.PCM.PN
<Jeimy Landaverde - Last Filed: 08/30/17 13:05>





Subjective





- Date & Time of Evaluation


Date of Evaluation: 08/30/17


Time of Evaluation: 12:00





- Subjective


Subjective: 





IM Progress Note for Dr. Abbott





Pt was seen and examined at bedside. No acute complaints at this time. No acute 

or adverse events overnight as per nursing staff. Pt denied fever, chills, sob, 

chest pains, n/v/d/c or urinary symptoms. 





Objective





- Vital Signs/Intake and Output


Vital Signs (last 24 hours): 


 











Temp Pulse Resp BP Pulse Ox


 


 98.2 F   62   20   134/90   95 


 


 08/30/17 00:01  08/30/17 00:01  08/30/17 00:01  08/30/17 00:01  08/30/17 00:01








Intake and Output: 


 











 08/30/17 08/30/17





 06:59 18:59


 


Intake Total 480 


 


Balance 480 














- Medications


Medications: 


 Current Medications





Acetaminophen (Tylenol 325mg Tab)  650 mg PO Q6H PRN


   PRN Reason: Pain, moderate (4-7)


   Last Admin: 08/29/17 21:53 Dose:  650 mg


Aspirin (Ecotrin)  81 mg PO DAILY [facility]


   Last Admin: 08/30/17 10:55 Dose:  81 mg


Atorvastatin Calcium (Lipitor)  40 mg PO DIN [facility]


   Last Admin: 08/29/17 17:21 Dose:  40 mg


Clonazepam (Klonopin)  0.5 mg PO DAILY [facility]


   PRN Reason: Protocol


   Last Admin: 08/30/17 10:55 Dose:  0.5 mg


Clonidine HCl (Catapres)  0.1 mg PO Q6H PRN


   PRN Reason: Systolic Blood Pressure


   Last Admin: 08/29/17 05:20 Dose:  0.1 mg


Donepezil HCl (Aricept)  5 mg PO HS [facility]


Enoxaparin Sodium (Lovenox)  40 mg SC DAILY [facility]


   PRN Reason: Protocol


   Last Admin: 08/30/17 10:55 Dose:  40 mg


Escitalopram Oxalate (Lexapro)  20 mg PO DAILY [facility]


   Last Admin: 08/29/17 11:06 Dose:  20 mg


Insulin Human Regular (Humulin R Low)  0 units SC ACHS [facility]


   PRN Reason: Protocol


   Last Admin: 08/30/17 08:33 Dose:  Not Given


Levothyroxine Sodium (Synthroid)  25 mcg PO 0600 [facility]


   Last Admin: 08/30/17 05:19 Dose:  25 mcg


Losartan Potassium (Cozaar)  100 mg PO DAILY [facility]


   Last Admin: 08/30/17 10:55 Dose:  100 mg


Metoprolol Tartrate (Lopressor)  25 mg PO BRKDIN [facility]


   Last Admin: 08/30/17 10:55 Dose:  25 mg


Multivitamins (Thera Tab)  1 tab PO DAILY [facility]


   Last Admin: 08/30/17 10:55 Dose:  1 tab


Pantoprazole Sodium (Protonix Ec Tab)  40 mg PO DAILY [facility]


   Last Admin: 08/30/17 10:55 Dose:  40 mg


Perphenazine (Perphenazine)  8 mg PO DAILY [facility]


   Last Admin: 08/29/17 11:06 Dose:  8 mg


Polyethylene Glycol (Miralax)  17 gm PO BID [facility]


   Last Admin: 08/30/17 10:56 Dose:  17 gm


Trazodone HCl (Desyrel)  100 mg PO HS [facility]


   Last Admin: 08/29/17 21:27 Dose:  100 mg











- Labs


Labs: 


 





 08/30/17 11:20 





 











PT  11.1 Seconds (9.9-11.8)   08/28/17  11:45    


 


INR  1.03  (0.93-1.08)   08/28/17  11:45    


 


APTT  27.2 Seconds (23.7-30.8)   08/28/17  11:45    














- Constitutional


Appears: No Acute Distress





- Head Exam


Head Exam: ATRAUMATIC, NORMAL INSPECTION, NORMOCEPHALIC





- Eye Exam


Eye Exam: EOMI, Normal appearance, PERRL


Pupil Exam: NORMAL ACCOMODATION, PERRL





- ENT Exam


ENT Exam: Mucous Membranes Moist, Normal Exam





- Neck Exam


Neck Exam: Full ROM, Normal Inspection.  absent: Lymphadenopathy





- Respiratory Exam


Respiratory Exam: Clear to Ausculation Bilateral, NORMAL BREATHING PATTERN





- Cardiovascular Exam


Cardiovascular Exam: REGULAR RHYTHM, +S1, +S2.  absent: Murmur





- GI/Abdominal Exam


GI & Abdominal Exam: Soft, Normal Bowel Sounds.  absent: Tenderness





- Extremities Exam


Extremities Exam: Full ROM, Normal Capillary Refill, Normal Inspection.  absent

: Joint Swelling, Pedal Edema





- Neurological Exam


Neurological Exam: Alert, Awake, CN II-XII Intact, Oriented x3





- Psychiatric Exam


Psychiatric exam: Normal Affect, Normal Mood





- Skin


Skin Exam: Dry, Intact, Normal Color, Warm





Assessment and Plan





- Assessment and Plan (Free Text)


Assessment: 





67 F with a pMHx of HTN, DM, alzheimers dementia and R breast Ca s/p resection 

admitted for uncontrolled HTN and Chest pain. 








Chest pain- resolved


No EKG changes


Trops neg x 4


A1c 6.1


CKP WNL


folate b12 wnl


Cont home meds: Lipitor


Cardiology following,  continue medical management





HTN


Stable


Cont home med: Lisinopril, lopressor


clonidine PRN


Monitor





DM


ISS


Accuchecks


Diabetic/HHD 





Alzheimer's dementia


Psych following, meds reviewed


EEG completed, Neuro following





Hypothyroidism


FT4 1.2


TSH 2.74


T4 7.8


Cont home med: Synthyroid





Depression/insomnia


Cont home med: Lexapro, trazodone, clonazepam





GI/DVT ppx reviewed


Protonix


Lovenox





Seen Reviewed and Discussed with Attending





<Rufus Abbott - Last Filed: 09/20/17 22:13>





Objective





- Vital Signs/Intake and Output


Vital Signs (last 24 hours): 


 











Temp Pulse Resp BP Pulse Ox


 


 98.2 F   66   20   116/69   95 


 


 09/01/17 07:40  09/01/17 07:40  09/01/17 07:40  09/01/17 10:22  09/01/17 07:40











- Labs


Labs: 


 





 08/31/17 07:40 





 08/31/17 07:40 





 











PT  11.1 Seconds (9.9-11.8)   08/28/17  11:45    


 


INR  1.03  (0.93-1.08)   08/28/17  11:45    


 


APTT  27.2 Seconds (23.7-30.8)   08/28/17  11:45    














Attending/Attestation





- Attestation


I have personally seen and examined this patient.: Yes


I have fully participated in the care of the patient.: Yes


I have reviewed all pertinent clinical information, including history, physical 

exam and plan: Yes

## 2017-08-30 NOTE — CON
NEUROLOGY CONSULTATION



DATE:  08/30/2017



REASON FOR CONSULTATION:  Dementia.



HISTORY OF PRESENT ILLNESS:  The patient is a 67-year-old female who has

been asked for evaluation of mental status.  The patient is admitted in the

hospital with chest pain.  The patient was noted to be confused and

forgetful.  As per the patient, she does forget a lot of things.  She

places things and forgets where she places them.  This is going on a lot. 

The patient has also seen a psychiatrist in the past and has been taking

perphenazine, Lexapro and trazodone at the movement.  As per patient, she

was seeing a psychiatrist for a while.  She was unable to give me a name.



REVIEW OF SYSTEMS:  Denies any headache, dizziness, positive for mild chest

pain, denies any shortness of breath, abdominal pain, constipation,

diarrhea, dysuria, polyuria, cough, and sputum production.



PAST MEDICAL HISTORY:  Includes hypertension and diabetes mellitus.



MEDICATIONS:  Her current medications include Catapres, Cozaar, trazodone,

aspirin, clonazepam, insulin, Lexapro, Lipitor, Lopressor, Lovenox,

Miralax, perphenazine, Protonix, Synthroid , multivitamins and Tylenol.



ALLERGIES:  NO KNOW DRUG ALLERGIES.



SOCIAL HISTORY:  Denies smoking, use of alcohol, or illicit drugs.



FAMILY HISTORY:  Reviewed and noncontributory to the case.



PHYSICAL EXAMINATION

GENERAL:  The patient is an elderly female, lying on the bed, in no acute

distress.

VITAL SIGNS:  Her blood pressure is 134/90, heart rate is 62 per minute,

breathing at the rate of 16 per minute, and temperature is 98.2 degrees

Fahrenheit.

HEENT:  Head is normocephalic and atraumatic.

NECK:  Supple.  There are no carotid bruits.

LUNGS:  Clear.

CARDIOPULMONARY:  S1 and S2 audible.  No murmurs.

ABDOMEN:  Soft and nontender.  Bowel sounds are present.

NEUROLOGIC:  Mental status.  The patient is awake, alert and oriented to

place, [facility], year is 2017, month is March.  She does not know the name

of the President, when I asked her as well as the pervious President, she

said [place].  Her memory and cognition are impaired.  Cranial nerve exam,

pupils are 3 mm bilaterally, reactive to light.  Visual fields are full. 

Extraocular movements are intact.  There is no facial asymmetry.  Palate is

upgoing bilaterally and tongue is midline.  Motor examination; tone shows

positive mild cogwheeling in both upper extremities.  Power is 5/5

bilaterally in all extremities, reflexes+2 and symmetrical.  Plantars

downgoing bilaterally.  Cerebellar Examination:  Finger-to-nose shows no

dysmetria.  Sensory Examination:  Intact to soft touch and pin prick.  The

gait is deferred at the moment.



LABORATORY DATA:  Reviewed shows WBC of 10.2, hemoglobin of 11.8,

hematocrit 34.4 and platelets of 217.  Sodium is 140, potassium 3.7,

chloride 104, carbon dioxide content of 26, BUN of 18, creatinine 0.7 and

glucose of 106.  Her vitamin B12 level is 390.  Her T4 and TSH are within

normal limits.



IMPRESSION:

1.  It appears that the patient dose have dementia likely Alzheimer's type.

2.  The patient does have Parkinson in future, which may have been

secondary to her prolonged use of antipsychotic medications.



RECOMMENDATIONS:

1.  The patient to have MRI of the brain without contrast.

2.  The patient to have an electroencephalogram.

3.  The patient to be started on Aricept 5 mg once day.

4.  The patient to have physical therapy for her gait and balance.

5.  The patient needs to be followed up as outpatient and will further

adjust patient's medications.



Thank you for the opportunity to participate in the care of this patient.





__________________________________________

Irma Garcia MD



DD:  08/30/2017 10:11:26

DT:  08/30/2017 13:17:20

Job # [id number]

## 2017-08-31 LAB
ALBUMIN/GLOB SERPL: 1.2 {RATIO} (ref 1.1–1.8)
ALP SERPL-CCNC: 94 U/L (ref 38–133)
ALT SERPL-CCNC: 34 U/L (ref 7–56)
AST SERPL-CCNC: 30 U/L (ref 15–39)
BASOPHILS # BLD AUTO: 0.03 K/MM3 (ref 0–2)
BASOPHILS NFR BLD: 0.3 % (ref 0–3)
BILIRUB SERPL-MCNC: 0.6 MG/DL (ref 0.2–1.3)
BUN SERPL-MCNC: 17 MG/DL (ref 7–21)
CALCIUM SERPL-MCNC: 8.9 MG/DL (ref 8.4–10.5)
CHLORIDE SERPL-SCNC: 105 MMOL/L (ref 98–107)
CO2 SERPL-SCNC: 27 MMOL/L (ref 21–33)
EOSINOPHIL # BLD: 0.2 10*3/UL (ref 0–0.7)
EOSINOPHIL NFR BLD: 1.9 % (ref 1.5–5)
ERYTHROCYTE [DISTWIDTH] IN BLOOD BY AUTOMATED COUNT: 15.4 % (ref 11.5–14.5)
GLOBULIN SER-MCNC: 3.3 GM/DL
GLUCOSE SERPL-MCNC: 111 MG/DL (ref 70–110)
GRANULOCYTES # BLD: 5.61 10*3/UL (ref 1.4–6.5)
GRANULOCYTES NFR BLD: 58.3 % (ref 50–68)
HCT VFR BLD CALC: 36.4 % (ref 36–48)
LYMPHOCYTES # BLD: 2.9 10*3/UL (ref 1.2–3.4)
LYMPHOCYTES NFR BLD AUTO: 30.3 % (ref 22–35)
MCH RBC QN AUTO: 30.3 PG (ref 25–35)
MCHC RBC AUTO-ENTMCNC: 33.5 G/DL (ref 31–37)
MCV RBC AUTO: 90.5 FL (ref 80–105)
MONOCYTES # BLD AUTO: 0.9 10*3/UL (ref 0.1–0.6)
MONOCYTES NFR BLD: 9.2 % (ref 1–6)
PLATELET # BLD: 200 10^3/UL (ref 120–450)
PMV BLD AUTO: 11.6 FL (ref 7–11)
POTASSIUM SERPL-SCNC: 4.1 MMOL/L (ref 3.6–5)
PROT SERPL-MCNC: 7.4 G/DL (ref 5.8–8.3)
SODIUM SERPL-SCNC: 142 MMOL/L (ref 132–148)
WBC # BLD AUTO: 9.6 10^3/UL (ref 4.5–11)

## 2017-08-31 RX ADMIN — INSULIN HUMAN SCH: 100 INJECTION, SOLUTION PARENTERAL at 07:57

## 2017-08-31 RX ADMIN — INSULIN HUMAN SCH: 100 INJECTION, SOLUTION PARENTERAL at 16:30

## 2017-08-31 RX ADMIN — THERA TABS SCH TAB: TAB at 10:08

## 2017-08-31 RX ADMIN — INSULIN HUMAN SCH: 100 INJECTION, SOLUTION PARENTERAL at 11:34

## 2017-08-31 RX ADMIN — PANTOPRAZOLE SODIUM SCH MG: 40 TABLET, DELAYED RELEASE ORAL at 10:11

## 2017-08-31 RX ADMIN — INSULIN HUMAN SCH: 100 INJECTION, SOLUTION PARENTERAL at 21:54

## 2017-08-31 RX ADMIN — POLYETHYLENE GLYCOL 3350 SCH: 17 POWDER, FOR SOLUTION ORAL at 10:09

## 2017-08-31 RX ADMIN — Medication SCH CAP: at 17:03

## 2017-08-31 RX ADMIN — ENOXAPARIN SODIUM SCH MG: 40 INJECTION SUBCUTANEOUS at 10:07

## 2017-08-31 NOTE — CP.PCM.PN
<Chantale Loera - Last Filed: 09/01/17 06:08>





Subjective





- Date & Time of Evaluation


Date of Evaluation: 08/31/17


Time of Evaluation: 10:00





- Subjective


Subjective: 





Internal medicine progress note for Dr. Diana Loera, PGY-1





Pt S & E at bedside this AM. 





Pt w/o complaints overnight. Right sided shaking decreased. Denies N/V/F/C, SOB

, CP, HA, ab pain.  Is tolerating diet.





Objective





- Vital Signs/Intake and Output


Vital Signs (last 24 hours): 


 











Temp Pulse Resp BP Pulse Ox


 


 98.7 F   64   18   176/99 H  99 


 


 08/31/17 07:00  08/31/17 10:11  08/31/17 07:00  08/31/17 10:11  08/31/17 07:00








Intake and Output: 


 











 08/31/17 08/31/17





 06:59 18:59


 


Intake Total 900 640


 


Balance 900 640














- Medications


Medications: 


 Current Medications





Acetaminophen (Tylenol 325mg Tab)  650 mg PO Q6H PRN


   PRN Reason: Pain, moderate (4-7)


   Last Admin: 08/29/17 21:53 Dose:  650 mg


Aspirin (Ecotrin)  81 mg PO DAILY [facility]


   Last Admin: 08/31/17 10:08 Dose:  81 mg


Atorvastatin Calcium (Lipitor)  40 mg PO DIN [facility]


   Last Admin: 08/30/17 16:49 Dose:  40 mg


Clonazepam (Klonopin)  0.5 mg PO DAILY [facility]


   PRN Reason: Protocol


   Last Admin: 08/31/17 10:08 Dose:  0.5 mg


Clonidine HCl (Catapres)  0.1 mg PO Q6H PRN


   PRN Reason: Systolic Blood Pressure


   Last Admin: 08/31/17 10:11 Dose:  0.1 mg


Donepezil HCl (Aricept)  5 mg PO HS [facility]


   Last Admin: 08/30/17 21:41 Dose:  5 mg


Enoxaparin Sodium (Lovenox)  40 mg SC DAILY [facility]


   PRN Reason: Protocol


   Last Admin: 08/31/17 10:07 Dose:  40 mg


Escitalopram Oxalate (Lexapro)  20 mg PO DAILY [facility]


   Last Admin: 08/31/17 10:09 Dose:  20 mg


Insulin Human Regular (Humulin R Low)  0 units SC ACHS [facility]


   PRN Reason: Protocol


   Last Admin: 08/31/17 11:34 Dose:  Not Given


Lactobacillus Acidophilus (Bacid Acidophilus)  1 cap PO BID [facility]


Levothyroxine Sodium (Synthroid)  25 mcg PO 0600 [facility]


   Last Admin: 08/31/17 05:33 Dose:  25 mcg


Losartan Potassium (Cozaar)  100 mg PO DAILY [facility]


   Last Admin: 08/31/17 10:08 Dose:  100 mg


Metoprolol Tartrate (Lopressor)  25 mg PO DAILY [facility]


   Last Admin: 08/31/17 10:08 Dose:  25 mg


Multivitamins (Thera Tab)  1 tab PO DAILY [facility]


   Last Admin: 08/31/17 10:08 Dose:  1 tab


Pantoprazole Sodium (Protonix Ec Tab)  40 mg PO DAILY [facility]


   Last Admin: 08/31/17 10:11 Dose:  40 mg


Perphenazine (Perphenazine)  8 mg PO BID [facility]


   Last Admin: 08/31/17 10:08 Dose:  8 mg


Trazodone HCl (Desyrel)  100 mg PO HS [facility]


   Last Admin: 08/30/17 21:41 Dose:  100 mg











- Labs


Labs: 


 





 08/31/17 07:40 





 08/31/17 07:40 





 











PT  11.1 Seconds (9.9-11.8)   08/28/17  11:45    


 


INR  1.03  (0.93-1.08)   08/28/17  11:45    


 


APTT  27.2 Seconds (23.7-30.8)   08/28/17  11:45    














- Constitutional


Appears: Non-toxic, No Acute Distress





- Head Exam


Head Exam: ATRAUMATIC, NORMAL INSPECTION, NORMOCEPHALIC





- Eye Exam


Eye Exam: EOMI, Normal appearance





- ENT Exam


ENT Exam: Mucous Membranes Moist, Normal Exam





- Neck Exam


Neck Exam: Normal Inspection





- Respiratory Exam


Respiratory Exam: Clear to Ausculation Bilateral, NORMAL BREATHING PATTERN





- Cardiovascular Exam


Cardiovascular Exam: REGULAR RHYTHM, +S1, +S2





- GI/Abdominal Exam


GI & Abdominal Exam: Soft, Normal Bowel Sounds.  absent: Tenderness





- Extremities Exam


Extremities Exam: Normal Inspection.  absent: Pedal Edema





- Neurological Exam


Neurological Exam: Alert, Awake, Oriented x3





- Psychiatric Exam


Psychiatric exam: Normal Affect, Normal Mood





- Skin


Skin Exam: Dry, Intact, Normal Color, Warm





Assessment and Plan





- Assessment and Plan (Free Text)


Assessment: 





67 F with a pMHx of HTN, DM, alzheimers dementia and R breast Ca s/p resection 

admitted for uncontrolled HTN and Chest pain, stable, awaiting placement at 

[facility]. 





Plan: 





Diarrhea


Lactobacillus


D/c'd Miralax


FU C diff study


Monitor





Chest pain- resolved


No EKG changes


Trops neg x 4


A1c 6.1


CKP WNL


folate b12 wnl


Cont home meds: Lipitor


Cardiology following,  cont medical management





HTN


Hypertensive overnight


Cont home med: Lisinopril, lopressor


clonidine PRN


Monitor





DM


ISS


Accuchecks


Diabetic/HHD 





Hx of Alzheimer's dementia


MRI marisel neg


FU EEG report-pending 


Neuro following -Aricept 5mg daily, PT for gait/balance


Psych following- Trilafon increased to 8mg BID





Hypothyroidism


FT4 1.2


TSH 2.74


T4 7.8


Cont home med: Synthyroid





Depression/insomnia


Cont home med: Lexapro, trazodone, clonazepam





GI/DVT ppx


Protonix


Lovenox





Dispo


PT


OOBTC


Ambulate with assistance


D/c to LT[facility] in AM





DW attending





<Rufus Abbott U - Last Filed: 09/20/17 22:13>





Objective





- Vital Signs/Intake and Output


Vital Signs (last 24 hours): 


 











Temp Pulse Resp BP Pulse Ox


 


 98.2 F   66   20   116/69   95 


 


 09/01/17 07:40  09/01/17 07:40  09/01/17 07:40  09/01/17 10:22  09/01/17 07:40











- Labs


Labs: 


 





 08/31/17 07:40 





 08/31/17 07:40 





 











PT  11.1 Seconds (9.9-11.8)   08/28/17  11:45    


 


INR  1.03  (0.93-1.08)   08/28/17  11:45    


 


APTT  27.2 Seconds (23.7-30.8)   08/28/17  11:45    














Attending/Attestation





- Attestation


I have personally seen and examined this patient.: Yes


I have fully participated in the care of the patient.: Yes


I have reviewed all pertinent clinical information, including history, physical 

exam and plan: Yes

## 2017-08-31 NOTE — PN
DATE:  08/31/2017



Patient is seen in room 572, bed 2.



SUBJECTIVE:  The patient appears to be comfortable, does not appear to be

in any distress.  Overnight nurse's notes were reviewed.  The patient

required minimal assistance.



PHYSICAL EXAMINATION:

VITAL SIGNS:  T-max afebrile, heart rate 64, 63, 62, blood pressure 176/99,

99/59, 155/88, 168/84, 164/97, respiration 18-20, O2 sat 99 to mid 90s to

high 90s.

HEENT:  Head is normocephalic and atraumatic.  Shows pink conjunctivae. 

Anicteric sclerae.  No oropharyngeal lesion.

NECK:  No neck rigidity.

CHEST:  Symmetrical.

LUNGS:  Shows no rales, crackles or wheezing.

CARDIOVASCULAR:  S1, S2, regular rhythm.

ABDOMEN:  Soft.  Positive bowel sound.

GENITALIA:  Female.

RECTAL:  Deferred.

EXTREMITIES:  Shows no pitting edema.  No calf pain.  No Homans' sign. 

Gait examination is not tested.

NEUROLOGIC:  The patient is alert, awake, responsive.

MUSCULOSKELETAL:  Shows a body mass index of 34.



DIAGNOSTIC:  On 08/31/2017; WBC 9.6, hemoglobin/hematocrit 12.2/36.4,

platelet 200.  Sodium 142, potassium 4.1, chloride 105, CO2 of 27, anion

gap 14, BUN 17, creatinine 0.8, GFR greater than 60.  Glucose 111, 110,

120.  Calcium 8.9.  LFTs are normal.



An MRI of the brain noted.



IMPRESSION:

1.  Chest pain, etiology undetermined and atypical chest pain.

2.  Most likely Alzheimer's dementia.

3.  Questionable Parkinsonism.

4.  Questionable Parkinson's.

5.  Hypertension with episodic hypotension, asymptomatic.

6.  Gait dysfunction.

7.  Obesity.

8.  History of diabetes mellitus with hemoglobin A1c of 6.1.

9.  History of hypothyroidism.

1.  Chest pain, atypical.

2.  Uncontrolled hypertension.

3.  Anxiety disorder.

4.  Transient hypoxemia.

5.  Mild normocytic anemia.

6.  Questionable diabetes with prediabetes and hemoglobin A1c of 6.1.

7.  Obesity with elevated body mass index of 34.5.

8.  Left hemidiaphragm focal eventration.

9.  Sinus bradycardia.

10.  History of hypothyroidism.

11.  Paranoia.

12.  Schizoaffective disorder versus schizophrenia.

13.  History of Alzheimer's dementia.

14.  History of psychiatric inpatient hospitalization.

15.  Questionable paranoid disorder.

16.  History of depression.

17.  History of dementia.

18.  Depression.

19.  Constipation.

20.  Hypothyroidism.

1.  Chest pain, etiology undetermined.

2.  Transiently uncontrolled hypertension.

3.  History of Alzheimer's dementia.

4.  History of depression, history of insomnia, history of hypothyroidism,

history of hypertension, and history of dyslipidemia.

5.  Normocytic anemia.

6.  History of obesity.

7.  History of anxiety.

1.  Chest pain, atypical.

2.  Uncontrolled hypertension.

3.  Anxiety disorder.

4.  Transient hypoxemia.

5.  Mild normocytic anemia.

6.  Questionable diabetes with prediabetes and hemoglobin A1c of 6.1.

7.  Obesity with elevated body mass index of 34.5.

8.  Left hemidiaphragm focal eventration.

9.  Sinus bradycardia.

10.  History of hypothyroidism.

11.  Paranoia.

12.  Schizoaffective disorder versus schizophrenia.

13.  History of Alzheimer's dementia.

14.  History of psychiatric inpatient hospitalization.

15.  Questionable paranoid disorder.

16.  History of depression.

17.  History of dementia.

18.  Depression.

19.  Constipation.

20.  Hypothyroidism.

1.  Chest pain, etiology undetermined.

2.  Transiently uncontrolled hypertension.

3.  History of Alzheimer's dementia.

4.  History of depression, history of insomnia, history of hypothyroidism,

history of hypertension, and history of dyslipidemia.

5.  Normocytic anemia.

6.  History of obesity.

7.  History of anxiety.

1.  Chest pain, etiology undetermined.

2.  Uncontrolled hypertension.

3.  Possible anxiety disorder.

4.  Mild normocytic anemia.

5.  Prediabetes.

6.  Left hemidiaphragm eventration.

7.  History of dyslipidemia.

8.  History of hypertension.

9.  History of type II diabetes mellitus.

10.  History of constipation.

11.  History of gait dysfunction.

12.  History of Alzheimer's dementia.

13.  History of depression.

1.  Chest pain, etiology undetermined.

2.  Transiently uncontrolled hypertension.

3.  History of Alzheimer's dementia.

4.  History of depression, history of insomnia, history of hypothyroidism,

history of hypertension, and history of dyslipidemia.

5.  Normocytic anemia.

6.  History of obesity.

7.  History of anxiety.





PLAN:  At this time, the patient's only diagnostic result which is pending

is EEG.  Once the patient is cleared from the EEG point of view.  The

patient's family has been requesting subacute rehab versus long-term

placement.  Case has been referred to  case management for

discharge to subacute rehab/long-term placement.  The patient otherwise is

medically stable for discharge.  The patient's current medications Aricept

5 mg at bedtime, Bacid 1 capsule twice a day, clonidine 0.1 mg q. 6 p.r.n.,

Cozaar 100 mg daily, trazodone 100 mg at bedtime, Ecotrin 81 mg daily,

regular insulin low-dose sliding scale coverage, Klonopin 0.5 mg daily,

Lexapro 20 mg daily, Lipitor 40 mg daily, Lopressor 25 mg daily, Lovenox 40

mg subcu daily, perphenazine 8 mg twice a day, Protonix 40 mg daily,

Synthroid 25 mcg daily, multivitamin 1 tablet daily, Tylenol p.r.n.  EEG

report pending.  Out of bed and stockings, occupational therapy,

occupational therapy, ambulation therapy, gait training ordered.



Dictated and electronically not signed, not read.





__________________________________________

Rufus Abbott MD





DD:  08/31/2017 11:18:01

DT:  08/31/2017 11:21:07

Job # [id number]





GINA

## 2017-08-31 NOTE — PN
SUBJECTIVE:  The patient is lying on the bed, in no acute distress.  Denies

having any headache or dizziness.



PHYSICAL EXAMINATION:

VITAL SIGNS:  Her blood pressure is 147/87, heart rate is 62 per minute,

breathing at the rate of 16 per minute and temperature is 98 degree

Fahrenheit.

HEENT EXAM:  Head is normocephalic and atraumatic.

NECK:  Supple.  There are no carotid bruits.

LUNGS:  Clear.

CARDIOVASCULAR EXAM:  S1 and S2, audible.  No murmurs.

ABDOMEN:  Soft and nontender.  Bowel sounds are present.

NEUROLOGICAL EXAM:  Mental Status:  The patient is awake, alert, oriented

to place as hospital, year as 2017.  She does not remember the name of the

President, President is [place].  She follows simple commands.  Cranial

Nerve Examination:  Pupils are 3 mm bilaterally, reactive to light.  Visual

fields are full.  Extraocular movements are intact.  There is no facial

asymmetry.  She is moving all 4 extremities symmetrically.  Plantars

downgoing bilaterally.



LABORATORY DATA:  Reviewed, shows WBC of 9.6, hemoglobin of 12.2,

hematocrit of 36.4 and platelets of 200.  Her sodium is 142, potassium 4.1,

chloride of 105, carbon dioxide content of 27, BUN of 17, creatinine 0.8

and glucose of 111.



IMPRESSION:

1.  Mild dementia, likely of Alzheimer's type.

2.  Parkinsonian features which are likely secondary to her prolonged use

of antipsychotic medications.



RECOMMENDATIONS:

1.  The patient had an electroencephalogram which is normal.

2.  The patient to be continued on Aricept.

3.  The patient is neurologically stable and clear for discharge.

4.  No further neurological recommendations at present except the patient's

dose of Aricept to be increased to 10 mg after 4 weeks.



Thank you for the opportunity to participate in the care of this patient.







__________________________________________

Irma Garcia MD





DD:  08/31/2017 18:10:00

DT:  08/31/2017 18:54:41

Job # [id number]

## 2017-08-31 NOTE — PN
SUBJECTIVE:  The patient was followed up today.  The does not have POA. 

The patient was evaluated by neurologist yesterday and had an brain MRI

which showed no acute changes.  As per Dr. Garcia, neurologist, the patient

has Alzheimer's dementia and if the patient would have Parkinson's symptoms

in the future it could be related to antipsychotic drugs.  The patient

presented to be alert, pleasant, tearful.  The patient said that her family

cannot take care of her at present moment.  The patient also said that they

have no other choice.  The patient feels anxious about the situation.  The

patient denied hearing voices, denied seeing things, denied thoughts of

killing herself or others.  As per report from the nursing staff, the

patient is complaining with the medications not behavioral issues.



PHYSICAL EXAMINATION

VITAL SIGNS:  Reviewed.  Temperature 98, pulse is 62, blood pressure

147/87, respirations 18, oxygen saturation is 98%.



MEDICATIONS:  Reviewed.  Tylenol, aspirin, Lipitor, Klonopin 0.5 mg daily,

may be will increase the dose to twice a day.  The patient also was started

on Aricept, perphenazine was increased to 8 mg twice a day.  The patient is

also on Lexapro.



LABORATORY DATA:  Labs were reviewed.  Most recent was from today.



MENTAL STATUS EXAMINATION:  The patient appears to be alert, anxious,

tearful.  Mood described as "I feel anxious".  Affect was tearful.  Mood

congruent.  The patient seems to be relatively goal directed thought

process.  Thought content, the patient denied visual, auditory or tactile

hallucinations.  Denied paranoid ideation.  The patient denied thoughts of

harming herself or others.  Denies intent or plans.  The patient does not

present to be psychotic.  Insight and judgment are limited.  Impulses are

well controlled.



IMPRESSION:  The patient has history of schizoaffective disorder most

likely.  The patient also has history of Alzheimer dementia.  The patient

has multiple medical issues, please see medical team notes for more

detailed information.



PLAN:  Continue current management.  Trilafon was increased yesterday. 

This writer will increase the Klonopin today.  The patient is on Lexapro,

which will be continued.  As per history, the patient does not have POA. 

The patient may decision for herself at present moment.   is

involved.  Case management also involved.  As per medical team notes, the

patient was referred to transitional care unit.



We will follow up and advise accordingly.



Thank you very much for letting me to participate in care of your patient.







__________________________________________

Carmen Soriano MD





DD:  08/31/2017 16:25:13

DT:  08/31/2017 17:23:19

Job # [id number]

## 2017-08-31 NOTE — PN
DATE:  08/31/2017



CARDIOLOGY FOLLOWUP



SUBJECTIVE:  The patient is in bed complaining of lower back pain.



PHYSICAL EXAMINATION:

VITAL SIGNS:  Blood pressure varies from 155 to 176 systolic, heart rate in

the 60s.

NECK:  Negative JVD.

LUNGS:  Without rales.

HEART:  S1 and S2.

EXTREMITIES:  Without edema..



LABORATORY DATA:  Hemoglobin is 12.  Chemistries, glucose is 111.



IMPRESSION:

1.  Resolution of chest pain.

2.  No evidence for acute coronary syndrome.

3.  Hypertension.

4.  Hypercholesterolemia.

5.  History of Alzheimer's.

6.  History of dementia.



PLAN:  Given these findings, we will continue on her losartan as well as

her clonidine for blood pressure control.







__________________________________________

Charly Rebolledo MD





DD:  08/31/2017 10:36:42

DT:  08/31/2017 10:39:36

Job # [id number]

## 2017-08-31 NOTE — PN
DATE:  08/30/2017



LOCATION:  The patient was seen in room 572, bed 2.



SUBJECTIVE:  The patient's vital signs, diagnostic data, imaging studies,

and recommendation and evaluation by all consultation reviewed.  For

further details of the patient's progress note and further details, refer

to the progress note of the medical resident from today.



IMPRESSION AND PLAN:

1.  Chest pain, atypical.

2.  Uncontrolled hypertension.

3.  Anxiety disorder.

4.  Transient hypoxemia.

5.  Mild normocytic anemia.

6.  Questionable diabetes with prediabetes and hemoglobin A1c of 6.1.

7.  Obesity with elevated body mass index of 34.5.

8.  Left hemidiaphragm focal eventration.

9.  Sinus bradycardia.

10.  History of hypothyroidism.

11.  Paranoia.

12.  Schizoaffective disorder versus schizophrenia.

13.  History of Alzheimer's dementia.

14.  History of psychiatric inpatient hospitalization.

15.  Questionable paranoid disorder.

16.  History of depression.

17.  History of dementia.

18.  Depression.

19.  Constipation.

20.  Hypothyroidism.

1.  Chest pain, etiology undetermined.

2.  Transiently uncontrolled hypertension.

3.  History of Alzheimer's dementia.

4.  History of depression, history of insomnia, history of hypothyroidism,

history of hypertension, and history of dyslipidemia.

5.  Normocytic anemia.

6.  History of obesity.

7.  History of anxiety.

1.  Chest pain, etiology undetermined.

2.  Uncontrolled hypertension.

3.  Possible anxiety disorder.

4.  Mild normocytic anemia.

5.  Prediabetes.

6.  Left hemidiaphragm eventration.

7.  History of dyslipidemia.

8.  History of hypertension.

9.  History of type II diabetes mellitus.

10.  History of constipation.

11.  History of gait dysfunction.

12.  History of Alzheimer's dementia.

13.  History of depression.

1.  Chest pain, etiology undetermined.

2.  Transiently uncontrolled hypertension.

3.  History of Alzheimer's dementia.

4.  History of depression, history of insomnia, history of hypothyroidism,

history of hypertension, and history of dyslipidemia.

5.  Normocytic anemia.

6.  History of obesity.

7.  History of anxiety.







CURRENT MEDICATIONS:

1.  Aricept 5 mg h.s.

2.  Clonidine 0.1 mg q.6 p.r.n.

3.  Cozaar 100 mg daily.

4.  Trazodone 100 mg h.s.

5.  Aspirin 81 mg daily.

6.  Humulin low-dose sliding scale coverage plan.

7.  Klonopin 0.5 mg daily.

8.  Lexapro 20 mg daily.

9.  Lipitor 40 mg daily.

10.  Lopressor will be decreased to 25 mg once a day due to bradycardia.

11.  The patient is on Lovenox 40 mg subcu daily for DVT prophylaxis.

12.  MiraLax 17 g twice a day.

13.  The patient's perphenazine is increased to 8 mg twice a day by the

Psychiatry.

14.  Protonix 40 mg daily.

15.  Synthroid 25 mcg daily.

16.  Multivitamin 1 tablet daily.

17.  Tylenol p.r.n.



PLAN:  At this time, the patient has been seen by on consult with

Cardiology, Neurology, and Psychiatry.  The patient's case referred to case

management for discharge planning as per recommendation by the

psychiatrist, Dr. Soriano and the family meeting with the psychiatrist,

the patient's family is looking for long-term placement, but the patient is

not interested in long-term placement and wants to go home.  The patient's

case is referred for TCU.



Repeat EKG ordered.  The patient's EEG was ordered and done according to

the dictation note.  The patient is on heart healthy diet, JOHANNA stockings,

SCDs, out of bed, physical therapy, occupational therapy.  At present, the

patient is awaiting long term placement versus subacute rehab placement. 

 has met with the patient and the son.  Family is looking for

subacute into long term care.



Dictated and electronically signed, not read.





__________________________________________

Rufus Abbott MD



DD:  08/30/2017 20:54:18

DT:  08/30/2017 21:03:29

Job # [id number]



MTDD

## 2017-09-01 VITALS — HEART RATE: 66 BPM | RESPIRATION RATE: 20 BRPM | OXYGEN SATURATION: 95 % | TEMPERATURE: 98.2 F

## 2017-09-01 VITALS — DIASTOLIC BLOOD PRESSURE: 69 MMHG | SYSTOLIC BLOOD PRESSURE: 116 MMHG

## 2017-09-01 RX ADMIN — Medication SCH CAP: at 10:21

## 2017-09-01 RX ADMIN — THERA TABS SCH TAB: TAB at 10:23

## 2017-09-01 RX ADMIN — ENOXAPARIN SODIUM SCH MG: 40 INJECTION SUBCUTANEOUS at 10:23

## 2017-09-01 RX ADMIN — PANTOPRAZOLE SODIUM SCH MG: 40 TABLET, DELAYED RELEASE ORAL at 10:23

## 2017-09-01 RX ADMIN — INSULIN HUMAN SCH: 100 INJECTION, SOLUTION PARENTERAL at 08:54

## 2017-09-01 NOTE — CP.PCM.DIS
Provider





- Provider


Date of Admission: 


08/29/17 11:32





Attending physician: 


Rufus Abbott MD





Primary care physician: 


NO PRIMARY CARE PROVIDER





Consults: 





Cardio: Dr. Rebolledo


Psych: Dr. Morales


Neuro: Dr. Garcia


Time Spent in preparation of Discharge (in minutes): 45





Hospital Course





- Lab Results


Lab Results: 


 Most Recent Lab Values











WBC  9.6 10^3/ul (4.5-11.0)   08/31/17  07:40    


 


RBC  4.02 10^6/uL (3.5-6.1)   08/31/17  07:40    


 


Hgb  12.2 g/dL (12.0-16.0)   08/31/17  07:40    


 


Hct  36.4 % (36.0-48.0)   08/31/17  07:40    


 


MCV  90.5 fl (80.0-105.0)   08/31/17  07:40    


 


MCH  30.3 pg (25.0-35.0)   08/31/17  07:40    


 


MCHC  33.5 g/dl (31.0-37.0)   08/31/17  07:40    


 


RDW  15.4 % (11.5-14.5)  H  08/31/17  07:40    


 


Plt Count  200 10^3/uL (120.0-450.0)   08/31/17  07:40    


 


MPV  11.6 fl (7.0-11.0)  H  08/31/17  07:40    


 


Gran %  58.3 % (50.0-68.0)   08/31/17  07:40    


 


Lymph % (Auto)  30.3 % (22.0-35.0)   08/31/17  07:40    


 


Mono % (Auto)  9.2 % (1.0-6.0)  H  08/31/17  07:40    


 


Eos % (Auto)  1.9 % (1.5-5.0)   08/31/17  07:40    


 


Baso % (Auto)  0.3 % (0.0-3.0)   08/31/17  07:40    


 


Gran #  5.61  (1.4-6.5)   08/31/17  07:40    


 


Lymph #  2.9  (1.2-3.4)   08/31/17  07:40    


 


Mono #  0.9  (0.1-0.6)  H  08/31/17  07:40    


 


Eos #  0.2  (0.0-0.7)   08/31/17  07:40    


 


Baso #  0.03 K/mm3 (0.0-2.0)   08/31/17  07:40    


 


Retic Count  1.19 % (0.5-1.5)   08/30/17  11:20    


 


PT  11.1 Seconds (9.9-11.8)   08/28/17  11:45    


 


INR  1.03  (0.93-1.08)   08/28/17  11:45    


 


APTT  27.2 Seconds (23.7-30.8)   08/28/17  11:45    


 


Sodium  142 mmol/L (132-148)   08/31/17  07:40    


 


Potassium  4.1 mmol/L (3.6-5.0)   08/31/17  07:40    


 


Chloride  105 mmol/L ()   08/31/17  07:40    


 


Carbon Dioxide  27 mmol/L (21-33)   08/31/17  07:40    


 


Anion Gap  14  (10-20)   08/31/17  07:40    


 


BUN  17 mg/dL (7-21)   08/31/17  07:40    


 


Creatinine  0.8 mg/dL (0.5-1.4)   08/31/17  07:40    


 


Est GFR ( Amer)  > 60   08/31/17  07:40    


 


Est GFR (Non-Af Amer)  > 60   08/31/17  07:40    


 


POC Glucose (mg/dL)  107 mg/dL ()   08/31/17  21:37    


 


Random Glucose  111 mg/dL ()  H  08/31/17  07:40    


 


Hemoglobin A1c  6.1 % (4.2-6.5)   08/28/17  16:15    


 


Calcium  8.9 mg/dL (8.4-10.5)   08/31/17  07:40    


 


Iron  77 ug/dL ()   08/30/17  11:20    


 


TIBC  379 ug/dL (265-497)   08/30/17  11:20    


 


% Saturation  20 % (20-55)   08/30/17  11:20    


 


Transferrin  285.59 mg/dL (206-381)   08/30/17  11:20    


 


Erythropoietin  3.4 mIU/mL (2.6-18.5)   08/30/17  11:20    


 


Ferritin  37.1 ng/mL  08/30/17  11:20    


 


Total Bilirubin  0.6 mg/dL (0.2-1.3)   08/31/17  07:40    


 


AST  30 U/L (15-39)   08/31/17  07:40    


 


ALT  34 U/L (7-56)   08/31/17  07:40    


 


Alkaline Phosphatase  94 U/L ()   08/31/17  07:40    


 


Total Creatine Kinase  123 U/L ()   08/28/17  23:59    


 


Troponin I  < 0.01 ng/mL  08/28/17  23:59    


 


Total Protein  7.4 g/dL (5.8-8.3)   08/31/17  07:40    


 


Albumin  4.1 g/dL (3.0-4.8)   08/31/17  07:40    


 


Globulin  3.3 gm/dL  08/31/17  07:40    


 


Albumin/Globulin Ratio  1.2  (1.1-1.8)   08/31/17  07:40    


 


Triglycerides  34 mg/dL ()  L  08/28/17  16:15    


 


Cholesterol  90 mg/dL (130-200)  L  08/28/17  16:15    


 


LDL Cholesterol Direct  < 30 mg/dL (0-129)   08/28/17  16:15    


 


HDL Cholesterol  55 mg/dL (29-60)   08/28/17  16:15    


 


Lipase  66 U/L ()   08/28/17  11:45    


 


Vitamin B12  390 pg/mL (239-931)   08/29/17  11:00    


 


Folate  > 20.0 ng/mL  08/29/17  11:00    


 


Free T4  1.20 ng/dL (0.78-2.19)   08/28/17  16:15    


 


Thyroxine (T4)  7.8 ug/dL (5.5-11.0)   08/28/17  16:15    


 


TSH 3rd Generation  2.74 mIU/mL (0.46-4.68)   08/28/17  16:15    


 


Urine Opiates Screen  Negative  (NEGATIVE)   08/30/17  20:30    


 


Urine Methadone Screen  Negative  (NEGATIVE)   08/30/17  20:30    


 


Ur Barbiturates Screen  Negative  (NEGATIVE)   08/30/17  20:30    


 


Ur Phencyclidine Scrn  Negative  (NEGATIVE)   08/30/17  20:30    


 


Ur Amphetamines Screen  Negative  (NEGATIVE)   08/30/17  20:30    


 


U Benzodiazepines Scrn  Negative  (NEGATIVE)   08/30/17  20:30    


 


U Oth Cocaine Metabols  Negative  (NEGATIVE)   08/30/17  20:30    


 


U Cannabinoids Screen  Negative  (NEGATIVE)   08/30/17  20:30    


 


RPR  Nonreactive  (NONREACTIVE)   08/29/17  11:10    














- Hospital Course


Hospital Course: 








66 YO F w/PMH sig for HTN, DM, Alzheimer's dementia, hx nephrolithiasis, R 

breast CA s/p surgery admitted for HTN and Chest pain x 2 days.  Pt attends 

adult day care where VS are monitored- pt originally sent to ED 2/2 HTN.  Pt 

reports 1 episode of Right sided chest pain - intermittent, moderate, "pressure

", with radiating to LUE. Pt was admitted for atypical chest pain, cardiology 

was consulted, Dr. Rebolledo, serial ekgs and troponins were trended - which 

resulted negative. Dr. Rebolledo recommended medical management with asa, lipitor, 

metoprolol, and clonidine prn. Pt's HTN continued to improve while admitted. Pt 

had questionable history of Alzheimers dementia, anxiety and depression, and 

due to pts behavior, Dr. Morales, psych was consulted. Dr. Morales recommended 

medical management with perphanazine, trazadone, klonopin, lexapro, and 

aricept. Neurology was also consulted to r/o any acute neurological pathology, 

EEG was performed and did not demonstrate any acute pathology. A MRI was 

obtained as well and also resulted without any acute intracranial pathology. Pt 

was experiencing diarrhea while inpatient, a cdiff cx was ordered. Pt was seen 

and examined at bedside today. Pt was resting comfortably and in no acute 

distress, and without any acute complaints at this time. No acute or adverse 

events overnight as per nursing staff. Pt is tolerating po intake, is moving 

bowel and bladder regularly and is ambulating with assistance. Pt family agreed 

to LTC at [facility]. Pt is to continue medical 

management there. Pt is to fu with PMD Dr. Abbott within 1 week. 








Discharge Exam





- Head Exam


Head Exam: ATRAUMATIC, NORMOCEPHALIC





- Eye Exam


Eye Exam: EOMI, Normal appearance, PERRL


Pupil Exam: NORMAL ACCOMODATION, PERRL





- ENT Exam


ENT Exam: Mucous Membranes Moist





- Respiratory Exam


Respiratory Exam: Clear to PA & Lateral, NORMAL BREATHING PATTERN, UNREMARKABLE





- Cardiovascular Exam


Cardiovascular Exam: RRR, +S1, +S2





- GI/Abdominal Exam


GI & Abdominal Exam: Normal Bowel Sounds, Soft.  absent: Tenderness





- Extremities Exam


Extremities exam: normal inspection





- Neurological Exam


Neurological exam: Alert, CN II-XII Intact, Normal Gait, Oriented x3, Reflexes 

Normal





- Psychiatric Exam


Psychiatric exam: Normal Affect, Normal Mood





- Skin


Skin Exam: Dry, Intact, Normal Color, Warm





Discharge Plan





- Follow Up Plan


Condition: FAIR


Disposition: HOME/ ROUTINE


Instructions:  Chest Pain (ED)


Additional Instructions: 


1. Pt is to be dc to [facility] as per CM. 


2. Pt is to continue medical management as ordered


3. Pt is to fu with PMD within 1 week 


4. Pt is welcomed to return to [facility] ED if symptoms change or worsen. 


Referrals: 


Rufus Abbott MD [Staff Provider] -

## 2017-09-01 NOTE — CP.PCM.PN
Subjective





- Date & Time of Evaluation


Date of Evaluation: 09/01/17


Time of Evaluation: 07:45





- Subjective


Subjective: 





Patient is seen this morning in room 572 bed 2.  She denies chest pain.  She 

complains of aches and pains of her legs and hands.





Objective





- Vital Signs/Intake and Output


Vital Signs (last 24 hours): 


 











Temp Pulse Resp BP Pulse Ox


 


 98.2 F   66   20   140/63   95 


 


 09/01/17 07:40  09/01/17 07:40  09/01/17 07:40  09/01/17 07:40  09/01/17 07:40








Intake and Output: 


 











 09/01/17 09/01/17





 06:59 18:59


 


Intake Total 960 


 


Balance 960 














- Medications


Medications: 


 Current Medications





Acetaminophen (Tylenol 325mg Tab)  650 mg PO Q6H PRN


   PRN Reason: Pain, moderate (4-7)


   Last Admin: 08/29/17 21:53 Dose:  650 mg


Aspirin (Ecotrin)  81 mg PO DAILY [facility]


   Last Admin: 08/31/17 10:08 Dose:  81 mg


Atorvastatin Calcium (Lipitor)  40 mg PO DIN [facility]


   Last Admin: 08/31/17 17:02 Dose:  40 mg


Clonazepam (Klonopin)  0.5 mg PO BID [facility]


   PRN Reason: Protocol


   Last Admin: 08/31/17 17:02 Dose:  0.5 mg


Clonidine HCl (Catapres)  0.1 mg PO Q6H PRN


   PRN Reason: Systolic Blood Pressure


   Last Admin: 08/31/17 10:11 Dose:  0.1 mg


Donepezil HCl (Aricept)  5 mg PO HS [facility]


   Last Admin: 08/31/17 21:56 Dose:  5 mg


Enoxaparin Sodium (Lovenox)  40 mg SC DAILY [facility]


   PRN Reason: Protocol


   Last Admin: 08/31/17 10:07 Dose:  40 mg


Escitalopram Oxalate (Lexapro)  20 mg PO DAILY [facility]


   Last Admin: 08/31/17 10:09 Dose:  20 mg


Insulin Human Regular (Humulin R Low)  0 units SC ACHS [facility]


   PRN Reason: Protocol


   Last Admin: 08/31/17 21:54 Dose:  Not Given


Lactobacillus Acidophilus (Bacid Acidophilus)  1 cap PO BID [facility]


   Last Admin: 08/31/17 17:03 Dose:  1 cap


Levothyroxine Sodium (Synthroid)  25 mcg PO 0600 [facility]


   Last Admin: 09/01/17 05:20 Dose:  25 mcg


Losartan Potassium (Cozaar)  100 mg PO DAILY [facility]


   Last Admin: 08/31/17 10:08 Dose:  100 mg


Metoprolol Tartrate (Lopressor)  25 mg PO DAILY [facility]


   Last Admin: 08/31/17 10:08 Dose:  25 mg


Multivitamins (Thera Tab)  1 tab PO DAILY [facility]


   Last Admin: 08/31/17 10:08 Dose:  1 tab


Pantoprazole Sodium (Protonix Ec Tab)  40 mg PO DAILY [facility]


   Last Admin: 08/31/17 10:11 Dose:  40 mg


Perphenazine (Perphenazine)  8 mg PO BID [facility]


   Last Admin: 08/31/17 17:03 Dose:  8 mg


Trazodone HCl (Desyrel)  100 mg PO HS [facility]


   Last Admin: 08/31/17 21:56 Dose:  100 mg











- Labs


Labs: 


 





 08/31/17 07:40 





 08/31/17 07:40 





 











PT  11.1 Seconds (9.9-11.8)   08/28/17  11:45    


 


INR  1.03  (0.93-1.08)   08/28/17  11:45    


 


APTT  27.2 Seconds (23.7-30.8)   08/28/17  11:45    














- Constitutional


Appears: No Acute Distress





- Head Exam


Head Exam: ATRAUMATIC, NORMOCEPHALIC





- Respiratory Exam


Respiratory Exam: Clear to Ausculation Bilateral, NORMAL BREATHING PATTERN





- Cardiovascular Exam


Cardiovascular Exam: +S1, +S2





- GI/Abdominal Exam


GI & Abdominal Exam: Soft, Normal Bowel Sounds.  absent: Tenderness





- Neurological Exam


Neurological Exam: Alert, Awake





Assessment and Plan





- Assessment and Plan (Free Text)


Assessment: 





Atypical chest pain


dementia


HTN


Diabetes mellitus


Hypothyroidism





Plan: 





Patient is complaining of arthritis pains.  She denies chest pain.  


Patient was evaluated by neurology and cardiology.  No acute coronary syndrome


EEG normal.  continue Aricept.


Physical therapy


awaiting AZIZA

## 2017-09-01 NOTE — PN
SUBJECTIVE:  The patient was followed up today.  The patient presented to

be alert, pleasant, cooperative.  The patient _____ since helping the

patient to take a shower since they provide good care and good bond for the

mother-in-law.  The patient seems to be happy today, smiled back to this

writer reported she woke up early in the morning time.  The patient denied

feeling depressed.  At the same time, the patient is very concern to go to

the nursing home.  This writer assure the patient that she will go to

subacute rehab.  The patient verbalized understanding.  No signs of

psychosis.  No agitation.  No aggression.  As per nursing report, the

patient complaints of the medication.  No signs of delusions.  No signs of

agitation.  The patient is complaint had good appetite and sleep.  No

behavioural disturbances.



PHYSICAL EXAMINATION:

VITAL SIGNS:  Temperature 98.2, pulse is 66, blood pressure 140/63,

respirations 20, oxygen saturation is 95.



MEDICATIONS:  Reviewed.  Tylenol, aspirin, Lipitor, Klonopin 0.5 mg twice a

day; Catapres; Aricept 5 mg at the nighttime; Lovenox; Lexapro 20 mg daily;

Humulin; Synthroid; Cozaar; Lopressor; multivitamin; Protonix; perphenazine

8 mg twice a day; trazodone 100 mg at the nighttime.



The patient had electroencephalogram yesterday.  Normal EEG.  No

epileptiform activity seen in the EEG recording.  The patient was seen by

Dr. Garcia, neurologist; primary care physician.  As per neurologist, mild

dementia most likely Alzheimer's type; parkinsonian features which are

likely secondary to her prolonged use of antipsychotic medications. 

Recommended the Aricept to be increase within 4 weeks.



MENTAL STATUS EXAMINATION:  The patient presented to be alert.  The patient

knows that she is in the hospital.  The patient was able to recognize her

daughter-in-law.  The patient also seems to be less anxious/depressed. 

Affect was more reactive.  Mood congruent.  Thought process seems to be

more organized and goal directed.  Thought content, the patient denies

visual, auditory, or tactile hallucinations.  Denied paranoid ideation. 

The patient denied thoughts of harming herself or others.  Denies any

intent or plan.  Insight and judgement impaired due to dementia and

impulses are well controlled.



IMPRESSION:  The patient has long history of schizoaffective disorder

versus major depressive disorder with psychotic symptoms versus

schizophrenia.  The patient also was diagnosed with Alzheimer's dementia

with behavioural disturbances and paranoid ideation.  The patient also has

some Parkinson's symptoms in the right upper extremities, and pill rolling

movements.  The patient also has history of diabetes as well as

hypertension.



PLAN:  Continue current management.  The patient was evaluated by social

worker.  Give referral for a subacute rehab.  The patient tolerated

medication well.  No side effects observed or reported.  The patient has

some Parkinsonian's symptoms on upper extremity and this is not worsening. 

The patient had that even at the time of admission.  Meanwhile, continue

current management.  There is no further recommendations.  This writer will

sign off.  The patient is to be followed up with psychiatrist in the

facility within one week of admission to the subacute rehab.  Discussed

with the nursing staff.  Discussed with the patient's daughter-in-law.



Thank you very much for letting me to participate in care of your patient.







__________________________________________

Carmen Soriano MD





DD:  09/01/2017 10:10:36

DT:  09/01/2017 11:56:14

Job # [id number]

## 2017-09-01 NOTE — EEG
INTRODUCTION:  This is a digitally recorded EEG monitoring using standard

EEG montages.



BACKGROUND RHYTHM:  The EEG shows a background activity of 8 Hertz alpha

activity in parieto-occipital region.  The EEG activity is bilaterally

symmetrical and synchronous.  There is attenuation of the background

activity on eye opening.  Moderate amount of myogenic artifact noticed in

this EEG recording.



ABNORMAL POTENTIALS:  No spike, sharp waves or focal slowing was seen. 

Photic stimulation and hyperventilation:  Photic stimulation did not reveal

any abnormality.  Hyperventilation was not performed.



IMPRESSION:  Normal EEG.  No epileptiform activity seen in this EEG

recording.







__________________________________________

Irma Garcia MD





DD:  08/31/2017 18:00:53

DT:  09/01/2017 2:35:22

Job # [id number]

## 2018-06-02 ENCOUNTER — HOSPITAL ENCOUNTER (EMERGENCY)
Dept: HOSPITAL 14 - H.ER | Age: 68
LOS: 1 days | Discharge: HOME | End: 2018-06-03
Payer: MEDICAID

## 2018-06-02 VITALS — BODY MASS INDEX: 33.3 KG/M2

## 2018-06-02 DIAGNOSIS — Z00.8: ICD-10-CM

## 2018-06-02 DIAGNOSIS — F02.80: Primary | ICD-10-CM

## 2018-06-02 DIAGNOSIS — F34.9: ICD-10-CM

## 2018-06-02 DIAGNOSIS — G30.9: ICD-10-CM

## 2018-06-02 DIAGNOSIS — I10: ICD-10-CM

## 2018-06-02 DIAGNOSIS — Z86.59: ICD-10-CM

## 2018-06-02 LAB
APAP SERPL-MCNC: < 10 UG/ML (ref 10–30)
BASOPHILS # BLD AUTO: 0.1 K/UL (ref 0–0.2)
BASOPHILS NFR BLD: 0.6 % (ref 0–2)
BILIRUB UR-MCNC: NEGATIVE MG/DL
BUN SERPL-MCNC: 18 MG/DL (ref 7–17)
CALCIUM SERPL-MCNC: 9.1 MG/DL (ref 8.4–10.2)
COLOR UR: (no result)
EOSINOPHIL # BLD AUTO: 0.4 K/UL (ref 0–0.7)
EOSINOPHIL NFR BLD: 5 % (ref 0–4)
ERYTHROCYTE [DISTWIDTH] IN BLOOD BY AUTOMATED COUNT: 15.5 % (ref 11.5–14.5)
GFR NON-AFRICAN AMERICAN: > 60
GLUCOSE UR STRIP-MCNC: (no result) MG/DL
HGB BLD-MCNC: 12 G/DL (ref 12–16)
LEUKOCYTE ESTERASE UR-ACNC: (no result) LEU/UL
LYMPHOCYTES # BLD AUTO: 2.7 K/UL (ref 1–4.3)
LYMPHOCYTES NFR BLD AUTO: 32.7 % (ref 20–40)
MCH RBC QN AUTO: 30 PG (ref 27–31)
MCHC RBC AUTO-ENTMCNC: 33.1 G/DL (ref 33–37)
MCV RBC AUTO: 90.4 FL (ref 81–99)
MONOCYTES # BLD: 1.2 K/UL (ref 0–0.8)
MONOCYTES NFR BLD: 14.8 % (ref 0–10)
NEUTROPHILS # BLD: 3.8 K/UL (ref 1.8–7)
NEUTROPHILS NFR BLD AUTO: 46.9 % (ref 50–75)
NRBC BLD AUTO-RTO: 0 % (ref 0–0)
PH UR STRIP: 7 [PH] (ref 5–8)
PLATELET # BLD: 199 K/UL (ref 130–400)
PMV BLD AUTO: 9.8 FL (ref 7.2–11.7)
PROT UR STRIP-MCNC: NEGATIVE MG/DL
RBC # BLD AUTO: 4 MIL/UL (ref 3.8–5.2)
RBC # UR STRIP: NEGATIVE /UL
SALICYLATES SERPL-MCNC: < 1 MG/DL
SP GR UR STRIP: 1.01 (ref 1–1.03)
SQUAMOUS EPITHIAL: < 1 /HPF (ref 0–5)
URINE CLARITY: CLEAR
UROBILINOGEN UR-MCNC: (no result) MG/DL (ref 0.2–1)
WBC # BLD AUTO: 8.2 K/UL (ref 4.8–10.8)

## 2018-06-02 PROCEDURE — 80048 BASIC METABOLIC PNL TOTAL CA: CPT

## 2018-06-02 PROCEDURE — 99285 EMERGENCY DEPT VISIT HI MDM: CPT

## 2018-06-02 PROCEDURE — 81003 URINALYSIS AUTO W/O SCOPE: CPT

## 2018-06-02 PROCEDURE — 93005 ELECTROCARDIOGRAM TRACING: CPT

## 2018-06-02 PROCEDURE — 85025 COMPLETE CBC W/AUTO DIFF WBC: CPT

## 2018-06-02 PROCEDURE — 71045 X-RAY EXAM CHEST 1 VIEW: CPT
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
## 2018-06-03 VITALS — HEART RATE: 86 BPM | DIASTOLIC BLOOD PRESSURE: 84 MMHG | OXYGEN SATURATION: 98 % | SYSTOLIC BLOOD PRESSURE: 160 MMHG

## 2018-06-03 VITALS — TEMPERATURE: 98.1 F | RESPIRATION RATE: 18 BRPM

## 2018-06-03 NOTE — ED PDOC
- Laboratory Results


Result Diagrams: 


 06/02/18 21:15





 06/02/18 21:15





- ECG


O2 Sat by Pulse Oximetry: 100 (RA)


Pulse Ox Interpretation: Normal





Medical Decision Making


Medical Decision Making: 


Patient endorsed to me by Dr. Guan @ 0700, pending crisis evaluation. 





__________________________________________________________


Scribe Attestation:


Documented by Edward Clark, acting as a scribe for Dr. Blessing Montoya MD.





Provider Scribe Attestation:


All medical record entries made by the Scribe were at my direction and 

personally dictated by me. I have reviewed the chart and agree that the record 

accurately reflects my personal performance of the history, physical exam, 

medical decision making, and the department course for this patient. I have 

also personally directed, reviewed, and agree with the discharge instructions 

and disposition.











Disposition





- Disposition


Forms:  [facility] (English)

## 2018-06-03 NOTE — RAD
EXAM:

  XR Chest, 1 View



EXAM DATE/TIME:

  6/2/2018 10:29 PM



CLINICAL HISTORY:

  68 years old, female; Signs and symptoms; Other: Agitation



TECHNIQUE:

  Frontal view of the chest.



COMPARISON:

  No relevant prior studies available.



FINDINGS:



  The mediastinal cardiac silouette is borderline prominent at least partially 

due to technique. 



  Slight prominence of the perihilar markings, however there are no focal 

infiltrates or consolidation.



  No effusions are identified.



  The osseous structures are normal. 





IMPRESSION:   



  No acute findings.

## 2018-06-04 NOTE — CARD
--------------- APPROVED REPORT --------------





EKG Measurement

Heart [id number]

MD 154P31

[id number]

[id number]

[id number]



<Conclusion>

Normal sinus rhythm

Prolonged QT

Abnormal ECG

## 2018-06-15 ENCOUNTER — HOSPITAL ENCOUNTER (INPATIENT)
Dept: HOSPITAL 14 - H.ER | Age: 68
LOS: 14 days | Discharge: NURSING, REHAB OR LONG-TERM CARE | DRG: 885 | End: 2018-06-29
Attending: PSYCHIATRY & NEUROLOGY | Admitting: PSYCHIATRY & NEUROLOGY
Payer: MEDICARE

## 2018-06-15 VITALS — BODY MASS INDEX: 33.3 KG/M2

## 2018-06-15 VITALS — OXYGEN SATURATION: 98 %

## 2018-06-15 DIAGNOSIS — E11.9: ICD-10-CM

## 2018-06-15 DIAGNOSIS — G30.9: ICD-10-CM

## 2018-06-15 DIAGNOSIS — M19.90: ICD-10-CM

## 2018-06-15 DIAGNOSIS — F02.81: ICD-10-CM

## 2018-06-15 DIAGNOSIS — F25.9: Primary | ICD-10-CM

## 2018-06-15 DIAGNOSIS — E78.00: ICD-10-CM

## 2018-06-15 DIAGNOSIS — I10: ICD-10-CM

## 2018-06-15 DIAGNOSIS — E03.9: ICD-10-CM

## 2018-06-15 LAB
BACTERIA #/AREA URNS HPF: (no result) /[HPF]
BASOPHILS # BLD AUTO: 0.1 K/UL (ref 0–0.2)
BASOPHILS NFR BLD: 1 % (ref 0–2)
BILIRUB UR-MCNC: NEGATIVE MG/DL
BUN SERPL-MCNC: 15 MG/DL (ref 7–17)
CALCIUM SERPL-MCNC: 8.4 MG/DL (ref 8.4–10.2)
COLOR UR: (no result)
EOSINOPHIL # BLD AUTO: 0.4 K/UL (ref 0–0.7)
EOSINOPHIL NFR BLD: 4.7 % (ref 0–4)
ERYTHROCYTE [DISTWIDTH] IN BLOOD BY AUTOMATED COUNT: 15.6 % (ref 11.5–14.5)
GFR NON-AFRICAN AMERICAN: > 60
GLUCOSE UR STRIP-MCNC: (no result) MG/DL
HGB BLD-MCNC: 11.2 G/DL (ref 12–16)
LEUKOCYTE ESTERASE UR-ACNC: (no result) LEU/UL
LYMPHOCYTES # BLD AUTO: 2.9 K/UL (ref 1–4.3)
LYMPHOCYTES NFR BLD AUTO: 33.4 % (ref 20–40)
MCH RBC QN AUTO: 30.1 PG (ref 27–31)
MCHC RBC AUTO-ENTMCNC: 33.2 G/DL (ref 33–37)
MCV RBC AUTO: 90.8 FL (ref 81–99)
MONOCYTES # BLD: 0.9 K/UL (ref 0–0.8)
MONOCYTES NFR BLD: 10.6 % (ref 0–10)
NEUTROPHILS # BLD: 4.3 K/UL (ref 1.8–7)
NEUTROPHILS NFR BLD AUTO: 50.3 % (ref 50–75)
NRBC BLD AUTO-RTO: 0.1 % (ref 0–0)
PH UR STRIP: 7 [PH] (ref 5–8)
PLATELET # BLD: 188 K/UL (ref 130–400)
PMV BLD AUTO: 10 FL (ref 7.2–11.7)
PROT UR STRIP-MCNC: NEGATIVE MG/DL
RBC # BLD AUTO: 3.73 MIL/UL (ref 3.8–5.2)
RBC # UR STRIP: NEGATIVE /UL
SP GR UR STRIP: < 1.005 (ref 1–1.03)
SQUAMOUS EPITHIAL: < 1 /HPF (ref 0–5)
URINE CLARITY: (no result)
UROBILINOGEN UR-MCNC: (no result) MG/DL (ref 0.2–1)
WBC # BLD AUTO: 8.6 K/UL (ref 4.8–10.8)

## 2018-06-15 PROCEDURE — GZHZZZZ GROUP PSYCHOTHERAPY: ICD-10-PCS | Performed by: PSYCHIATRY & NEUROLOGY
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
## 2018-06-16 LAB
BUN SERPL-MCNC: 14 MG/DL (ref 7–17)
CALCIUM SERPL-MCNC: 8.6 MG/DL (ref 8.4–10.2)
FERRITIN SERPL-MCNC: 24.3 NG/ML (ref 11.1–264)
GFR NON-AFRICAN AMERICAN: > 60
HDLC SERPL-MCNC: 33 MG/DL (ref 30–70)
LDLC SERPL-MCNC: < 30 MG/DL (ref 0–129)
T4 SERPL-MCNC: 7.14 UG/DL (ref 5.5–11)
VIT B12 SERPL-MCNC: 296 PG/ML (ref 239–931)

## 2018-06-16 RX ADMIN — DIVALPROEX SODIUM SCH MG: 250 TABLET, DELAYED RELEASE ORAL at 17:32

## 2018-06-16 RX ADMIN — DIVALPROEX SODIUM SCH MG: 250 TABLET, DELAYED RELEASE ORAL at 13:20

## 2018-06-16 RX ADMIN — RISPERIDONE SCH MG: 0.5 TABLET, ORALLY DISINTEGRATING ORAL at 13:20

## 2018-06-16 RX ADMIN — RISPERIDONE SCH MG: 0.5 TABLET, ORALLY DISINTEGRATING ORAL at 21:16

## 2018-06-16 RX ADMIN — Medication SCH TAB: at 09:47
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
## 2018-06-17 RX ADMIN — DIVALPROEX SODIUM SCH MG: 250 TABLET, DELAYED RELEASE ORAL at 09:10

## 2018-06-17 RX ADMIN — Medication SCH TAB: at 09:02

## 2018-06-17 RX ADMIN — RISPERIDONE SCH MG: 1 TABLET, ORALLY DISINTEGRATING ORAL at 21:41

## 2018-06-17 RX ADMIN — RISPERIDONE SCH MG: 0.5 TABLET, ORALLY DISINTEGRATING ORAL at 09:02
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
## 2018-06-18 LAB — FOLATE SERPL-MCNC: 13.2 NG/ML

## 2018-06-18 RX ADMIN — DIVALPROEX SODIUM SCH MG: 500 TABLET, DELAYED RELEASE ORAL at 16:44

## 2018-06-18 RX ADMIN — Medication SCH TAB: at 08:20

## 2018-06-18 RX ADMIN — RISPERIDONE SCH MG: 1 TABLET, ORALLY DISINTEGRATING ORAL at 08:20

## 2018-06-18 NOTE — CARD
--------------- APPROVED REPORT --------------





EKG Measurement

Heart [id number]

DC 154P46

[id number]

[id number]

[id number]



<Conclusion>

Normal sinus rhythm

Normal ECG
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
## 2018-06-19 RX ADMIN — DIVALPROEX SODIUM SCH MG: 500 TABLET, DELAYED RELEASE ORAL at 08:23

## 2018-06-19 RX ADMIN — Medication SCH TAB: at 08:22

## 2018-06-19 RX ADMIN — DIVALPROEX SODIUM SCH MG: 500 TABLET, DELAYED RELEASE ORAL at 16:21
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
## 2018-06-19 NOTE — CON
DATE:



HISTORY OF PRESENT ILLNESS:  This is a 68-year-old  female with

history of multiple medical problems including hypercholesterolemia,

hypothyroidism, hypertension, who [facility] 

Medical consultation was confirmed medical followup.  The patient denied to

have any shortness of breath, chest pain, nausea or vomiting.  Other review

of systems is negative.



ALLERGIES:  NO KNOWN ALLERGIES.



MEDICATIONS:  Reviewed as per MAR.



PAST MEDICAL HISTORY:  Hypertension, hypercholesterolemia, and

hypothyroidism.



SOCIAL HISTORY:  No history of smoking, EtOH, or substance abuse.



FAMILY HISTORY:  Not contributory.



PHYSICAL EXAMINATION:

GENERAL:  The patient is in bed, not in any cardiopulmonary distress.

VITAL SIGNS:  Blood pressure 110/66, temperature 97.6, respiratory rate 19,

and pulse 90.

HEENT:  Pupils equal and reactive to light.  Normal-appearing mucosa of the

conjunctivae, oropharynx, and nasal membrane mucosa.

NECK:  Supple.  No JVD.  No carotid bruit.  No lymph node.  No thyromegaly.

CHEST AND LUNGS:  Bilateral symmetrical expansion.  Good air exchange.  No

rales and no rhonchi.

CARDIOVASCULAR SYSTEM:  PMI not localized.  S1, S2.  No additional sounds.

ABDOMEN:  Normoactive bowel sounds.  No tenderness.  No organomegaly.  No

masses.

EXTREMITIES:  No cyanosis, no clubbing, no edema.

CNS:  Alert, awake, oriented _____.  No neurological deficit could be

appreciated.



ASSESSMENT:  Hypertension, hypercholesterolemia, hypothyroidism.



PLAN:  Continue current medications including levothyroxine, lisinopril,

and atorvastatin.  We will follow up with you.







__________________________________________

Karen Mejia MD



DD:  06/18/2018 20:23:04

DT:  06/19/2018 5:04:38

Job # [id number]

## 2018-06-20 RX ADMIN — DIVALPROEX SODIUM SCH MG: 500 TABLET, DELAYED RELEASE ORAL at 16:21

## 2018-06-20 RX ADMIN — Medication SCH TAB: at 09:09

## 2018-06-20 RX ADMIN — DIVALPROEX SODIUM SCH MG: 500 TABLET, DELAYED RELEASE ORAL at 09:09

## 2018-06-20 NOTE — PCM.PYCHPN
Psychiatric Progress Note





- Psychiatric Progress Note


Patient seen today, length of contact: Patient evaluated, case discussed with 

team, chart reviewed


Patient Chief Complaint: 


"I need to clean."


Problems Identified/Issues Discussed: 


Patient is calmer and more redirectable, w/ improved sleep.   She continues to 

be euphronic, paranoid and cleaning excessively.  She denies AH/VH.  No SI/HI.  

No adverse effects to medications reported.


Medication Change: No


Medical Record Reviewed: Yes


Consults ordered or reviewed: 


Medicine consult





Mental Status Examination





- Cognitive Function


Orientation: Person, Place, Situation, Time


Memory: Impaired


Attention: Poor


Concentration: Poor


Association: Loose


Fund of Knowledge: Poor


Decription of patient's judgement and insights: 


Poor I/J





- Mood


Mood: Euphoric





- Affect


Affect: Other (Labile)





- Speech


Speech: Appropriate





- Formal Thought Process


Formal Thought Process: Paranoia, Loosening of associations


Psychotic Thoughts and Behaviors: 


+Paranoia





- Suicidal Ideation


Suicidal Ideation: No





- Homicidal Ideation


Homicidal Ideation: No





Goal/Treatment Plan





- Goal/Treatment Plan


Need for Continued Stay: Remain at risks for inpatient hospitalization, Severe 

depression anxiety, Discharge may exacerbated symptoms


Progress Toward Problem(s) and Goals/Treatment Plan: 


Schizoaffective Disorder; Dementia with behavioral disturbances





-Individual and group therapy


-Psychoeducation


-Medicine consult


-Continue Risperdal and Depakote; will titrate as clinically indicated


-Disposition planning


Estimated Date of D/C: 06/25/18

## 2018-06-20 NOTE — PN
DATE:  06/19/2018



SUBJECTIVE:  The patient was seen on 06/19/2018.  She was not in any

cardiopulmonary distress with blood pressure 114/69, temperature 97.2,

respiratory rate 19, and pulse 85.



PHYSICAL EXAMINATION:

HEENT:  Pupils equal, reactive to light.  Normal-appearing mucosa of the

conjunctivae, oropharynx, and nasal membrane.

NECK:  Supple.  No JVD.  No carotid bruits.  No lymph node.  No

thyromegaly.

CHEST AND LUNGS:  Bilateral symmetrical expansion.  Good air exchange.  No

rales, no rhonchi.

CARDIOVASCULAR:  PMI not localized.  S1, S2.  No additional sounds.

ABDOMEN:  Normoactive bowel sounds.  No tenderness.  No organomegaly.  No

masses.

EXTREMITIES:  No cyanosis, no clubbing, no edema.

CNS:  Alert, awake, oriented x2.  No neurological deficit could be

appreciated.



ASSESSMENT:  Hypertension, hypothyroidism, hypercholesterolemia.



PLAN:  Continue current medications.  Continue Accu-Cheks with insulin

coverage as needed.  Current hemoglobin A1c is 6.6.  Start the patient on

metformin 500 mg daily.







__________________________________________

Karen Mejia MD



DD:  06/20/2018 18:24:14

DT:  06/20/2018 18:25:12

Job # [id number]

## 2018-06-21 RX ADMIN — BISMUTH SUBSALICYLATE PRN MG: 525 SUSPENSION ORAL at 20:31

## 2018-06-21 RX ADMIN — DIVALPROEX SODIUM SCH MG: 500 TABLET, DELAYED RELEASE ORAL at 09:28

## 2018-06-21 RX ADMIN — DIVALPROEX SODIUM SCH MG: 500 TABLET, DELAYED RELEASE ORAL at 17:28

## 2018-06-21 RX ADMIN — Medication SCH TAB: at 09:28

## 2018-06-21 NOTE — PCM.PYCHPN
Psychiatric Progress Note





- Psychiatric Progress Note


Patient seen today, length of contact: Patient evaluated, case discussed with 

team, chart reviewed


Patient Chief Complaint: 


"I need to clean."


Problems Identified/Issues Discussed: 


No new events overnight.  Patient is calmer and more redirectable, w/ improved 

sleep.   She continues to be euphoric and cleaning excessively.  She denies AH/

VH.  No SI/HI.  No adverse effects to medications reported.


Medication Change: No


Medical Record Reviewed: Yes


Consults ordered or reviewed: 


Medicine consult





Mental Status Examination





- Cognitive Function


Orientation: Person, Place, Situation, Time


Memory: Impaired


Attention: Poor


Concentration: Poor


Association: Loose


Fund of Knowledge: Poor


Decription of patient's judgement and insights: 


Poor I/J





- Mood


Mood: Euphoric





- Affect


Affect: Other (Labile)





- Speech


Speech: Appropriate





- Formal Thought Process


Formal Thought Process: Paranoia, Loosening of associations


Psychotic Thoughts and Behaviors: 


+Paranoia





- Suicidal Ideation


Suicidal Ideation: No





- Homicidal Ideation


Homicidal Ideation: No





Goal/Treatment Plan





- Goal/Treatment Plan


Need for Continued Stay: Remain at risks for inpatient hospitalization, Severe 

depression anxiety, Discharge may exacerbated symptoms


Progress Toward Problem(s) and Goals/Treatment Plan: 


Schizoaffective Disorder; Dementia with behavioral disturbances





-Individual and group therapy


-Psychoeducation


-Medicine consult


-Continue Risperdal and Depakote; will titrate as clinically indicated


-Check VPA level


-Obtain collateral history to determine baseline level of functioning 


-Disposition planning


Estimated Date of D/C: 06/25/18

## 2018-06-21 NOTE — PN
DATE:  06/21/2018



SUBJECTIVE:  The patient was seen today, 06/21/2018.  She is not in any

cardiopulmonary distress.



OBJECTIVE:

VITAL SIGNS:  Blood pressure 142/78, temperature 97.7, respiratory rate 20,

and pulse 86.

HEENT:  Pupils equal and reactive to light.  Normal-appearing mucosa of the

conjunctivae, oropharynx, and nasal membrane mucosa.

CARDIOVASCULAR SYSTEM:  PMI not localized.  S1, S2.  No additional sounds.

ABDOMEN:  Normoactive bowel sounds.  No tenderness.  No organomegaly.  No

masses.

EXTREMITIES:  No cyanosis, no clubbing, no edema.

CENTRAL NERVOUS SYSTEM:  Alert, awake, and oriented x2.  No neurological

deficit could be appreciated.



ASSESSMENT:  Hypertension, hypothyroidism, hypercholesterolemia.



PLAN:  Continue current medications and management.





__________________________________________

[facility] MD Roberto





DD:  06/21/2018 16:04:21

DT:  06/21/2018 17:23:10

Job # [id number]

## 2018-06-22 LAB
BASOPHILS # BLD AUTO: 0 K/UL (ref 0–0.2)
BASOPHILS NFR BLD: 0.7 % (ref 0–2)
EOSINOPHIL # BLD AUTO: 0.2 K/UL (ref 0–0.7)
EOSINOPHIL NFR BLD: 2.9 % (ref 0–4)
ERYTHROCYTE [DISTWIDTH] IN BLOOD BY AUTOMATED COUNT: 15.6 % (ref 11.5–14.5)
HGB BLD-MCNC: 11.9 G/DL (ref 12–16)
LYMPHOCYTES # BLD AUTO: 2.4 K/UL (ref 1–4.3)
LYMPHOCYTES NFR BLD AUTO: 33.7 % (ref 20–40)
MCH RBC QN AUTO: 30.1 PG (ref 27–31)
MCHC RBC AUTO-ENTMCNC: 33.4 G/DL (ref 33–37)
MCV RBC AUTO: 90.4 FL (ref 81–99)
MONOCYTES # BLD: 1 K/UL (ref 0–0.8)
MONOCYTES NFR BLD: 14.6 % (ref 0–10)
NEUTROPHILS # BLD: 3.4 K/UL (ref 1.8–7)
NEUTROPHILS NFR BLD AUTO: 48.1 % (ref 50–75)
NRBC BLD AUTO-RTO: 0.1 % (ref 0–0)
PLATELET # BLD: 165 K/UL (ref 130–400)
PMV BLD AUTO: 10.5 FL (ref 7.2–11.7)
RBC # BLD AUTO: 3.95 MIL/UL (ref 3.8–5.2)
WBC # BLD AUTO: 7.1 K/UL (ref 4.8–10.8)

## 2018-06-22 RX ADMIN — DIVALPROEX SODIUM SCH MG: 500 TABLET, DELAYED RELEASE ORAL at 09:26

## 2018-06-22 RX ADMIN — Medication SCH TAB: at 09:26

## 2018-06-22 RX ADMIN — BISMUTH SUBSALICYLATE PRN MG: 525 SUSPENSION ORAL at 18:05

## 2018-06-22 RX ADMIN — DIVALPROEX SODIUM SCH MG: 500 TABLET, DELAYED RELEASE ORAL at 16:05

## 2018-06-22 NOTE — PCM.PYCHPN
Psychiatric Progress Note





- Psychiatric Progress Note


Patient seen today, length of contact: Patient evaluated, case discussed with 

team, chart reviewed


Patient Chief Complaint: 


"I need to clean."


Problems Identified/Issues Discussed: 


Patient continues to be labile and has been crying intermittently for no clear 

reason. She continues to clean excessively.  She denies AH/VH.  No SI/HI.  No 

adverse effects to medications reported.


Diagnostic Results: 


VPA 84.7 on 6/22/18


Medication Change: No


Medical Record Reviewed: Yes


Consults ordered or reviewed: 


Medicine consult





Mental Status Examination





- Cognitive Function


Orientation: Person, Place, Situation, Time


Memory: Impaired


Attention: Poor


Concentration: Poor


Association: Loose


Fund of Knowledge: Poor


Decription of patient's judgement and insights: 


Poor I/J





- Mood


Mood: Depressed





- Affect


Affect: Other (Labile)





- Speech


Speech: Appropriate





- Formal Thought Process


Formal Thought Process: Paranoia, Loosening of associations


Psychotic Thoughts and Behaviors: 


+Paranoia





- Suicidal Ideation


Suicidal Ideation: No





- Homicidal Ideation


Homicidal Ideation: No





Goal/Treatment Plan





- Goal/Treatment Plan


Need for Continued Stay: Remain at risks for inpatient hospitalization, Severe 

depression anxiety, Discharge may exacerbated symptoms


Progress Toward Problem(s) and Goals/Treatment Plan: 


Schizoaffective Disorder; Dementia with behavioral disturbances





-Individual and group therapy


-Psychoeducation


-Medicine consult


-Continue Risperdal and Depakote


-VPA 84.7 on 6/22/18


-Obtain collateral history to determine baseline level of functioning 


-Disposition planning


Estimated Date of D/C: 06/26/18

## 2018-06-23 RX ADMIN — DIVALPROEX SODIUM SCH MG: 500 TABLET, DELAYED RELEASE ORAL at 11:25

## 2018-06-23 RX ADMIN — Medication SCH TAB: at 11:28

## 2018-06-23 RX ADMIN — DIVALPROEX SODIUM SCH MG: 500 TABLET, DELAYED RELEASE ORAL at 16:42

## 2018-06-23 NOTE — PCM.PYCHPN
Psychiatric Progress Note





- Psychiatric Progress Note


Patient seen today, length of contact: Patient evaluated, case discussed with 

team, chart reviewed


Patient Chief Complaint: 





anxiety depression at times


 


Problems Identified/Issues Discussed: 





alteration in mood


Medical Problems: 





per chart


Diagnostic Results: 





per psychiatry


per medicine


per nursing 


per social work


DSM 5 Symptoms Update: 





alteration in mood


alteration in cognition


Medication Change: No


Medical Record Reviewed: Yes


Consults ordered or reviewed: 





pt seen by hospitals





Mental Status Examination





- Cognitive Function


Orientation: Person, Place, Situation, Time


Memory: Impaired


Attention: Poor


Concentration: Poor


Association: Loose


Fund of Knowledge: Poor


Decription of patient's judgement and insights: 





poor





- Mood


Mood: Depressed





- Affect


Affect: Other (Labile)





- Speech


Speech: Appropriate





- Formal Thought Process


Formal Thought Process: Paranoia, Loosening of associations





- Suicidal Ideation


Suicidal Ideation: No





- Homicidal Ideation


Homicidal Ideation: No





Goal/Treatment Plan





- Goal/Treatment Plan


Need for Continued Stay: Remain at risks for inpatient hospitalization, Severe 

depression anxiety, Discharge may exacerbated symptoms


Progress Toward Problem(s) and Goals/Treatment Plan: 





inpt milieu 


adjust meds per status


vital signs and clinical observation per protocol and per status


discharge planning in progress


Estimated Date of D/C: 06/26/18





- Smoking Cessation


Smoking Cessation Initiated: No


Reason for not providing: defers

## 2018-06-24 RX ADMIN — Medication SCH TAB: at 08:37

## 2018-06-24 RX ADMIN — DIVALPROEX SODIUM SCH MG: 500 TABLET, DELAYED RELEASE ORAL at 18:02

## 2018-06-24 RX ADMIN — DIVALPROEX SODIUM SCH MG: 500 TABLET, DELAYED RELEASE ORAL at 08:37

## 2018-06-24 NOTE — PCM.PYCHPN
Psychiatric Progress Note





- Psychiatric Progress Note


Patient seen today, length of contact: Patient evaluated, case discussed with 

team, chart reviewed


Patient Chief Complaint: 


pt received prn lorazepam last evening


pacing in hallway at times presenting to desk, then seated in social area , 

labile at times  


Problems Identified/Issues Discussed: 





alteration in mood


alteration in cognition


Medical Problems: 





per chart


Diagnostic Results: 





per psychiatry


per medicine


per nursing 


per social work


DSM 5 Symptoms Update: 





confused, labile at times requires prompting to attend to tasks


Medication Change: No


Medical Record Reviewed: Yes


Consults ordered or reviewed: 





pt being followed by hospitalist 





Mental Status Examination





- Cognitive Function


Orientation: Person, Place, Situation, Time


Memory: Impaired


Attention: Poor


Concentration: Poor


Association: Loose


Fund of Knowledge: Poor


Decription of patient's judgement and insights: 





poor





- Mood


Mood: Depressed





- Affect


Affect: Other (Labile)





- Speech


Speech: Appropriate





- Formal Thought Process


Formal Thought Process: Paranoia, Loosening of associations





- Suicidal Ideation


Suicidal Ideation: No





- Homicidal Ideation


Homicidal Ideation: No





Goal/Treatment Plan





- Goal/Treatment Plan


Need for Continued Stay: Remain at risks for inpatient hospitalization, Severe 

depression anxiety, Discharge may exacerbated symptoms


Progress Toward Problem(s) and Goals/Treatment Plan: 





inpt milieu 


adjust meds per status


vital signs and clinical observation per protocol and per status


discharge planning in progress


Estimated Date of D/C: 06/26/18





- Smoking Cessation


Smoking Cessation Initiated: No


Reason for not providing: pt defers

## 2018-06-25 RX ADMIN — Medication SCH TAB: at 09:12

## 2018-06-25 RX ADMIN — DIVALPROEX SODIUM SCH MG: 500 TABLET, DELAYED RELEASE ORAL at 09:12

## 2018-06-25 RX ADMIN — DIVALPROEX SODIUM SCH MG: 500 TABLET, DELAYED RELEASE ORAL at 17:53
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
## 2018-06-25 NOTE — PCM.[email or web address]
Psychiatric Progress Note





- Psychiatric Progress Note


Patient seen today, length of contact: Patient evaluated, case discussed with 

team, chart reviewed


Patient Chief Complaint: 


"My whole body is in pain."


Problems Identified/Issues Discussed: 


Patient continues to be labile and has been crying intermittently for no clear 

reason.  She believes her medications are causing her full body pain and are 

making it difficult for her to walk.   Although she does seems to have acute 

parkinsonian symptoms, can not rule out acute dystonic reaction or parkinsonian 

symptoms at this time.  Will stop Risperdal and start Seroquel to decrease 

chance of EPS. 


Diagnostic Results: 


VPA 84.7 on 6/22/18


Medication Change: Yes (Stop Aricept and Risperdal; Start Seroquel)


Medical Record Reviewed: Yes


Consults ordered or reviewed: 


Medicine consult





Mental Status Examination





- Cognitive Function


Orientation: Person, Place, Situation, Time


Memory: Impaired


Attention: Poor


Concentration: Poor


Association: Loose


Fund of Knowledge: Poor


Decription of patient's judgement and insights: 


Poor I/J





- Mood


Mood: Depressed





- Affect


Affect: Other (Labile)





- Speech


Speech: Appropriate





- Formal Thought Process


Formal Thought Process: Loosening of associations


Psychotic Thoughts and Behaviors: 


Denies acute AH/VH/paranoia





- Suicidal Ideation


Suicidal Ideation: No





- Homicidal Ideation


Homicidal Ideation: No





Goal/Treatment Plan





- Goal/Treatment Plan


Need for Continued Stay: Remain at risks for inpatient hospitalization, Severe 

depression anxiety, Discharge may exacerbated symptoms


Progress Toward Problem(s) and Goals/Treatment Plan: 


Schizoaffective Disorder; Dementia with behavioral disturbances





-Individual and group therapy


-Psychoeducation


-Medicine consult


-Continue Depakote


-VPA 84.7 on 6/22/18


-Stop Risperdal and Aricept; start Seroquel and will titrate as clinically 

indicated


-Disposition planning


Estimated Date of D/C: 06/29/18

## 2018-06-26 RX ADMIN — Medication SCH TAB: at 08:13

## 2018-06-26 RX ADMIN — DIVALPROEX SODIUM SCH MG: 500 TABLET, DELAYED RELEASE ORAL at 08:11

## 2018-06-26 RX ADMIN — DIVALPROEX SODIUM SCH MG: 500 TABLET, DELAYED RELEASE ORAL at 16:37

## 2018-06-26 NOTE — PCM.PYCHPN
Psychiatric Progress Note





- Psychiatric Progress Note


Patient seen today, length of contact: Patient evaluated, case discussed with 

team, chart reviewed


Patient Chief Complaint: 


"I need to clean."


Problems Identified/Issues Discussed: 


Patient continues to be labile and has been cleaning excessively.  She 

continues to have sleep disturbances. She denies acute pain or stiffness. No 

visual symptoms of EPS.  


Diagnostic Results: 


VPA 84.7 on 6/22/18


Medication Change: Yes (Increase Seroquel)


Medical Record Reviewed: Yes


Consults ordered or reviewed: 


Medicine consult





Mental Status Examination





- Cognitive Function


Orientation: Person, Place, Situation, Time


Memory: Impaired


Attention: Poor


Concentration: Poor


Association: Loose


Fund of Knowledge: Poor


Decription of patient's judgement and insights: 


Poor I/J





- Mood


Mood: Neutral





- Affect


Affect: Other (Labile)





- Speech


Speech: Appropriate





- Formal Thought Process


Formal Thought Process: Loosening of associations


Psychotic Thoughts and Behaviors: 


Denies acute AH/VH/paranoia





- Suicidal Ideation


Suicidal Ideation: No





- Homicidal Ideation


Homicidal Ideation: No





Goal/Treatment Plan





- Goal/Treatment Plan


Need for Continued Stay: Remain at risks for inpatient hospitalization, Severe 

depression anxiety, Discharge may exacerbated symptoms


Progress Toward Problem(s) and Goals/Treatment Plan: 


Schizoaffective Disorder; Dementia with behavioral disturbances





-Individual and group therapy


-Psychoeducation


-Medicine consult


-Continue Depakote


-VPA 84.7 on 6/22/18


-Increase Seroquel


-Disposition planning


Estimated Date of D/C: 06/29/18

## 2018-06-27 RX ADMIN — DIVALPROEX SODIUM SCH MG: 500 TABLET, DELAYED RELEASE ORAL at 16:42

## 2018-06-27 RX ADMIN — DIVALPROEX SODIUM SCH MG: 500 TABLET, DELAYED RELEASE ORAL at 08:36

## 2018-06-27 RX ADMIN — Medication SCH TAB: at 08:36

## 2018-06-27 NOTE — PN
DATE:  06/27/2018



DAILY PROGRESS NOTE



SUBJECTIVE:  The patient is seen today, 06/27/2018.



PHYSICAL EXAMINATION:

GENERAL:  She is not in any cardiopulmonary distress.

VITAL SIGNS:  Blood pressure 142/78, temperature 97.2, respiratory rate 20

and pulse 83.

HEENT:  Pupils equal, reactive to light.  Normal-appearing mucosa of the

conjunctivae, oropharynx and nasal membrane mucosa.

NECK:  Supple.  No JVD.  No carotid bruit.  No lymph node.  No thyromegaly.

CHEST AND LUNGS:  Bilateral symmetrical expansion.  Good air exchange.  No

rales.  No rhonchi.

CARDIOVASCULAR SYSTEM:  PMI not localized.  S1, S2.  No additional sounds.

ABDOMEN:  Normoactive bowel sounds.  No tenderness.  No organomegaly.  No

masses.

EXTREMITIES:  No cyanosis, no clubbing, no edema.

CNS:  Alert, awake, oriented x2.  No neurological deficit could be

appreciated.



ASSESSMENT:  Hypertension, hypothyroidism, hypercholesterolemia,

osteoarthritis.



PLAN:  Continue current medications and physical therapy.  We will follow

up with you.





__________________________________________

Karen Mejia MD





DD:  06/27/2018 18:29:33

DT:  06/27/2018 18:30:52

Job # [id number]

## 2018-06-27 NOTE — PCM.PYCHPN
Psychiatric Progress Note





- Psychiatric Progress Note


Patient seen today, length of contact: Patient evaluated, case discussed with 

team, chart reviewed


Patient Chief Complaint: 


"I'm okay."


Problems Identified/Issues Discussed: 


Patient continues to be labile, but is calmer and more redirectable.  She 

continues to want to clean excessively, but can be engaged in other activities.

  She has improved sleep and appetite. No AH/VH/SI/HI.  No EPS.


Diagnostic Results: 


VPA 84.7 on 6/22/18


Medication Change: Yes (Increase Seroquel)


Medical Record Reviewed: Yes


Consults ordered or reviewed: 


Medicine consult





Mental Status Examination





- Cognitive Function


Orientation: Person, Place, Situation, Time


Memory: Impaired


Attention: Poor


Concentration: Poor


Association: Loose


Fund of Knowledge: Poor


Decription of patient's judgement and insights: 


Poor I/J





- Mood


Mood: Neutral





- Affect


Affect: Other (Labile)





- Speech


Speech: Appropriate





- Formal Thought Process


Formal Thought Process: Loosening of associations


Psychotic Thoughts and Behaviors: 


Denies acute AH/VH/paranoia





- Suicidal Ideation


Suicidal Ideation: No





- Homicidal Ideation


Homicidal Ideation: No





Goal/Treatment Plan





- Goal/Treatment Plan


Need for Continued Stay: Remain at risks for inpatient hospitalization, 

Discharge may exacerbated symptoms, Severe functional impairment


Progress Toward Problem(s) and Goals/Treatment Plan: 


Schizoaffective Disorder; Dementia with behavioral disturbances





-Individual and group therapy


-Psychoeducation


-Medicine consult


-Continue Depakote


-VPA 84.7 on 6/22/18


-Increase Seroquel


-Disposition planning


Estimated Date of D/C: 06/29/18

## 2018-06-28 RX ADMIN — Medication SCH TAB: at 09:25

## 2018-06-28 RX ADMIN — DIVALPROEX SODIUM SCH MG: 500 TABLET, DELAYED RELEASE ORAL at 09:25

## 2018-06-28 RX ADMIN — DIVALPROEX SODIUM SCH MG: 500 TABLET, DELAYED RELEASE ORAL at 18:11

## 2018-06-28 NOTE — PCM.PYCHPN
Psychiatric Progress Note





- Psychiatric Progress Note


Patient seen today, length of contact: Patient evaluated, case discussed with 

team, chart reviewed


Patient Chief Complaint: 


pt is noted to be calmer, is reportedly more engagable requires redirection 

total support with care, no reported prn's in past 24 hours


Problems Identified/Issues Discussed: 





alteration in mood


alteration in cognition


Medical Problems: 





per chart


Diagnostic Results: 





per psychiatry


per medicine


per nursing 


per social work


DSM 5 Symptoms Update: 





alteration in cognition less labile and irritable 


Medication Change: Yes (Increase Seroquel)


Medical Record Reviewed: Yes


Consults ordered or reviewed: 





pt being seen by dr hendrix





Mental Status Examination





- Cognitive Function


Orientation: Person, Place, Situation


Memory: Impaired


Attention: Poor


Concentration: Poor


Association: Loose


Fund of Knowledge: Poor


Decription of patient's judgement and insights: 





poor





- Mood


Mood: Neutral





- Affect


Affect: Other (Labile)





- Speech


Speech: Appropriate





- Formal Thought Process


Formal Thought Process: Loosening of associations





- Suicidal Ideation


Suicidal Ideation: No





- Homicidal Ideation


Homicidal Ideation: No





Goal/Treatment Plan





- Goal/Treatment Plan


Need for Continued Stay: Remain at risks for inpatient hospitalization, 

Discharge may exacerbated symptoms, Severe functional impairment


Progress Toward Problem(s) and Goals/Treatment Plan: 





inpt milieu 


adjust meds per status


vital signs and clinical observation per protocol and per status-pt appears to 

be at baseline


discharge planning in progress pt to be discharged in am to [facility] 


Estimated Date of D/C: 06/29/18





- Smoking Cessation


Smoking Cessation Initiated: No


Reason for not providing: pt defers

## 2018-06-28 NOTE — PN
DATE:  06/28/2018



SUBJECTIVE:  The patient is seen today, 06/28/2018.  She denied any

shortness of breath and states that occasionally she has palpitation.



PHYSICAL EXAMINATION:

VITAL SIGNS:  Blood pressure 115/72, temperature 97, respiratory rate 18,

and pulse 76.

HEENT:  Pupils are equal and reactive to light.  Normal appearing mucosa of

the conjunctiva, oropharyngeal and nasal membrane mucosa.

NECK:  Supple.  No JVD.  No carotid bruits.  No lymph node.  No

thyromegaly.

CHEST AND LUNGS:  Bilateral symmetrical expansion.  Good air exchange.  No

rales.  No rhonchi

CARDIOVASCULAR SYSTEM:  PMI not localized.  S1 and S2.  No additional

sounds.

ABDOMEN:  Normoactive bowel sounds.  No tenderness.  No organomegaly.  No

masses.

EXTREMITIES:  No cyanosis.  No clubbing.  No edema.

CENTRAL NERVOUS SYSTEM:  Alert, awake, oriented x3.  No neurological

deficit could be appreciated.



ASSESSMENT:  Hypercholesterolemia, hypothyroidism, hypertension.



PLAN:  Continue current medication, on physical therapy.  We will check

thyroid function.  TSH is 1.86.  We reviewed also EKG that showed normal

sinus rhythm with no abnormality could be detected.  Continue current

medications and management.





__________________________________________

Karen Mejia MD





DD:  06/28/2018 18:13:09

DT:  06/28/2018 19:44:48

Job # [id number]

## 2018-06-29 VITALS — DIASTOLIC BLOOD PRESSURE: 70 MMHG | HEART RATE: 76 BPM | SYSTOLIC BLOOD PRESSURE: 108 MMHG

## 2018-06-29 VITALS — TEMPERATURE: 97.6 F | RESPIRATION RATE: 18 BRPM

## 2018-06-29 RX ADMIN — Medication SCH TAB: at 08:22

## 2018-06-29 RX ADMIN — DIVALPROEX SODIUM SCH MG: 500 TABLET, DELAYED RELEASE ORAL at 08:22

## 2019-02-13 ENCOUNTER — HOSPITAL ENCOUNTER (OUTPATIENT)
Dept: HOSPITAL 42 - RAD | Age: 69
End: 2019-02-13
Payer: MEDICARE